# Patient Record
Sex: FEMALE | Race: WHITE | URBAN - METROPOLITAN AREA
[De-identification: names, ages, dates, MRNs, and addresses within clinical notes are randomized per-mention and may not be internally consistent; named-entity substitution may affect disease eponyms.]

---

## 2017-01-20 ENCOUNTER — OFFICE VISIT (OUTPATIENT)
Dept: EDUCATION SERVICES | Facility: CLINIC | Age: 27
End: 2017-01-20

## 2017-01-20 VITALS — HEIGHT: 63 IN | BODY MASS INDEX: 33.77 KG/M2 | WEIGHT: 190.6 LBS

## 2017-01-20 DIAGNOSIS — E16.1 HYPERINSULINEMIA: Primary | ICD-10-CM

## 2017-01-20 NOTE — MR AVS SNAPSHOT
After Visit Summary   1/20/2017    Janice Mendoza    MRN: [id number]           Patient Information     Date Of Birth          1990        Visit Information        Provider Department      1/20/2017 1:30 PM Jodi Pratt RD [facility] Diabetes        Care Instructions    1. We reviewed healthy diet and carbohydrate counting today  2. Spread your carbohydrate foods throughout the day to help prevent symptoms of low blood sugar  3. 45-60 grams of carbohydrate for all main meals and 15-30 grams carb for snacks inbetween meals  4. Stop drinking all sugar sweetened beverages  5. Follow up as needed  Jodi Pratt RD, LD, CDE  Diabetes Care  [address]  [address]  [place] [address]  Phone: [phone]  Appointment line: [phone]  Email: [email or web address]            Follow-ups after your visit        Your next 10 appointments already scheduled     Jan 25, 2017  9:30 AM   (Arrive by 9:15 AM)   RETURN KNEE with Jorge Estrada MD   [facility] Sports Medicine ([facility] [place])    [address]  [address] [facility] [address] [phone]            Jan 25, 2017 10:00 AM   (Arrive by 9:30 AM)   RETURN DIABETES with Emely Rivas MD   [facility] Endocrinology ([facility])    [address] [place] [address]  [address] [facility] [address] [phone]              Who to contact     Please call your clinic at [phone] to:    Ask questions about your health    Make or cancel appointments    Discuss your medicines    Learn about your test results    Speak to your doctor   If you have compliments or concerns about an experience at your clinic, or if you wish to file a complaint, please contact [facility] Patient Relations at [phone] or email us at [email or web address]         Additional Information About Your Visit        Luciano  Information     [employer] is an electronic gateway that provides easy, online access to your medical records. With [employer], you can request a clinic appointment, read your test results, renew a prescription or communicate with your care team.     To sign up for [employer] visit the website at [email or web address]   You will be asked to enter the access code listed below, as well as some personal information. Please follow the directions to create your username and password.     Your access code is: [email or web address]  Expires: 3/13/2017  6:31 AM     Your access code will  in 90 days. If you need help or a new code, please contact your [facility] or call [phone] for assistance.        Care EveryWhere ID     This is your Care EveryWhere ID. This could be used by other organizations to access your [place] medical records  [id number]         Blood Pressure from Last 3 Encounters:   16 108/74   16 109/62   16 131/82    Weight from Last 3 Encounters:   16 88.633 kg (195 lb 6.4 oz)   16 87.544 kg (193 lb)   16 89.3 kg (196 lb 13.9 oz)              Today, you had the following     No orders found for display       Primary Care Provider Office Phone # Fax Carol Harrison PA-C [phone]       [facility] [address]  [place] [facility] [address]        Thank you!     Thank you for choosing [facility] DIABETES  for your care. Our goal is always to provide you with excellent care. Hearing back from our patients is one way we can continue to improve our services. Please take a few minutes to complete the written survey that you may receive in the mail after your visit with us. Thank you!             Your Updated Medication List - Protect others around you: Learn how to safely use, store and throw away your medicines at www.disposemymeds.org.          This list is accurate as of: 17  2:45 PM.  Always use your most recent med list.                    Brand Name Dispense Instructions for use    blood glucose monitoring lancets     1 Box    Use to test blood sugar 4 times daily or as directed.       blood glucose monitoring meter device kit    no brand specified    1 kit    Use to test blood sugar 4 times daily or as directed.       BLOOD GLUCOSE TEST STRIPS Strp     100 each    4 times a day check       ZOLOFT PO      Take 150 mg by mouth daily
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
## 2017-02-01 ENCOUNTER — OFFICE VISIT (OUTPATIENT)
Dept: ORTHOPEDICS | Facility: CLINIC | Age: 27
End: 2017-02-01

## 2017-02-01 DIAGNOSIS — M25.561 CHRONIC PAIN OF RIGHT KNEE: Primary | ICD-10-CM

## 2017-02-01 DIAGNOSIS — G89.29 CHRONIC PAIN OF RIGHT KNEE: Primary | ICD-10-CM

## 2017-02-01 RX ORDER — TRIAMCINOLONE ACETONIDE 40 MG/ML
40 INJECTION, SUSPENSION INTRA-ARTICULAR; INTRAMUSCULAR ONCE
Qty: 1 ML | Refills: 0 | OUTPATIENT
Start: 2017-02-01 | End: 2017-02-01

## 2017-02-01 NOTE — NURSING NOTE
[address] [address]  Dept: [phone]  ______________________________________________________________________________    Patient: Janice Mendoza   : 1990   MRN: [id number]   2017    INVASIVE PROCEDURE SAFETY CHECKLIST    Date: 2017   Procedure: Right knee CSI with Kenalog  Patient Name: Janice Mendoza  MRN: [id number]  YOB: 1990    Action: Complete sections as appropriate. Any discrepancy results in a HARD COPY until resolved.     PRE PROCEDURE:  Patient ID verified with 2 identifiers (name and  or MRN): Yes  Procedure and site verified with patient/designee (when able): Yes  Accurate consent documentation in medical record: Yes  H&P (or appropriate assessment) documented in medical record: Yes  H&P must be up to 20 days prior to procedure and updates within 24 hours of procedure as applicable: NA  Relevant diagnostic and radiology test results appropriately labeled and displayed as applicable: Yes  Procedure site(s) marked with provider initials: NA    TIMEOUT:  Time-Out performed immediately prior to starting procedure, including verbal and active participation of all team members addressing the following:Yes  * Correct patient identify  * Confirmed that the correct side and site are marked  * An accurate procedure consent form  * Agreement on the procedure to be done  * Correct patient position  * Relevant images and results are properly labeled and appropriately displayed  * The need to administer antibiotics or fluids for irrigation purposes during the procedure as applicable   * Safety precautions based on patient history or medication use    DURING PROCEDURE: Verification of correct person, site, and procedures any time the responsibility for care of the patient is transferred to another member of the care team.
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
## 2017-10-16 ENCOUNTER — ANESTHESIA EVENT (OUTPATIENT)
Dept: SURGERY | Facility: AMBULATORY SURGERY CENTER | Age: 27
End: 2017-10-16

## 2017-10-16 ENCOUNTER — OFFICE VISIT - RIVER FALLS (OUTPATIENT)
Dept: FAMILY MEDICINE | Facility: CLINIC | Age: 27
End: 2017-10-16
Payer: COMMERCIAL

## 2017-10-16 ENCOUNTER — OFFICE VISIT (OUTPATIENT)
Dept: ORTHOPEDICS | Facility: CLINIC | Age: 27
End: 2017-10-16

## 2017-10-16 VITALS — WEIGHT: 195.33 LBS | HEIGHT: 63 IN | BODY MASS INDEX: 34.61 KG/M2

## 2017-10-16 DIAGNOSIS — M25.561 RIGHT KNEE PAIN, UNSPECIFIED CHRONICITY: Primary | ICD-10-CM

## 2017-10-16 DIAGNOSIS — G89.29 CHRONIC PAIN OF RIGHT KNEE: ICD-10-CM

## 2017-10-16 DIAGNOSIS — M25.561 CHRONIC PAIN OF RIGHT KNEE: ICD-10-CM

## 2017-10-16 ASSESSMENT — ENCOUNTER SYMPTOMS
POLYDIPSIA: 0
POSTURAL DYSPNEA: 0
CHILLS: 0
FATIGUE: 1
WEIGHT LOSS: 0
INCREASED ENERGY: 0
ARTHRALGIAS: 1
DYSPNEA ON EXERTION: 0
JOINT SWELLING: 1
NECK PAIN: 0
HEMOPTYSIS: 0
DECREASED APPETITE: 0
MUSCLE WEAKNESS: 1
COUGH: 0
POLYPHAGIA: 0
MUSCLE CRAMPS: 1
WEIGHT GAIN: 0
COUGH DISTURBING SLEEP: 0
BACK PAIN: 0
WHEEZING: 0
FEVER: 0
SPUTUM PRODUCTION: 0
MYALGIAS: 1
NIGHT SWEATS: 1
ALTERED TEMPERATURE REGULATION: 0
SNORES LOUDLY: 1
STIFFNESS: 1
SHORTNESS OF BREATH: 0
HALLUCINATIONS: 0

## 2017-10-16 ASSESSMENT — MIFFLIN-ST. JEOR: SCORE: 1570.92

## 2017-10-16 NOTE — NURSING NOTE
Reason For Visit:   Chief Complaint   Patient presents with     RECHECK     Right knee       ?  No  Occupation: EMT  Currently working? Yes.  Work status?  Full time.  Date of injury: None, new pain started over a week ago.   Date of surgery: 2/26/16 & 3/19/16  Type of surgery:Right Knee Examination Under Anesthesia, Arthroscopic  Anterior Cruciate Ligament Reconstruction With Hamstring Autograft. Posterolateral Corner Reconstruction with Allograft. Removal of Hardware;  Irrigation And Debridement Right Leg  choice anesthesia .  Smoker: Yes, pack a day  Request smoking cessation information: No    Pain Assessment  Patient Currently in Pain: Yes  0-10 Pain Scale: 7  Primary Pain Location: Knee  Pain Orientation: Right
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
## 2017-10-16 NOTE — LETTER
10/16/2017       RE: Janice Mendoza  [address] [facility] [address]     Dear Colleague,    Thank you for referring your patient, Janice Mendoza, to the [facility] at [facility]. Please see a copy of my visit note below.    HISTORY OF PRESENT ILLNESS:  Armando is a pleasant 26-year-old female who had a devastating lateral plateau fracture in 06/2016.  She underwent open reduction internal fixation at [facility] and then subsequently underwent ACL PLC reconstruction by Dr. Estrada.  She was doing well for several months.  She had an injection in her knee joint in 02/2016 which provided good relief.  Unfortunately, over the last few weeks without trauma, she has had worsening lateral knee pain and the sense of instability.  She obtained an x-ray by her PCP who told her that she had evidence of hardware failure and instructed her to follow up with Dr. Estrada.  She has been ambulating with crutches ever since.  She denies fevers, chills, nausea, vomiting.  She denies numbness or tingling.      PHYSICAL EXAMINATION:  Pleasant, age-appropriate female in no acute distress.   RESPIRATORY:  Nonlabored.   CARDIOVASCULAR:  Regular rate and rhythm.  Extremities warm and well perfused.   FOCUSED EXAMINATION:  Of the right lower extremity reveals a well-healed incision.  She fires EHL, FHL, gastrocsoleus complex and tibialis anterior with 5/5 strength.  Sensation intact to light touch in sural, saphenous, deep peroneal, superficial peroneal and tibial nerve distributions.  No effusion.  1+ laxity to varus at 0 and 30 degrees flexion.  1A Lachman's compared with the contralateral side.  Tender to palpation of her pes bursa and lateral joint line.      IMAGING:  X-rays of the right knee were obtained which show interval progression of her lateral compartment osteoarthritis.  There is no evidence of hardware failure or loosening.  By the patient's report, there was  concern of loosening around the BioComposite screw in the fibular head, which is not indicated by these x-rays.      IMPRESSION:  Status post multi-ligament reconstruction with lateral compartment osteoarthritis and pain.        PLAN:  Discussed the findings with the patient.  There is no radiographic evidence of failure and her ligamentous exam while not normal, shows that her ligamentous reconstruction is intact.  Discussed that this may be a flare and related to her progression of degenerative changes.  It may be reasonable to proceed with another injection versus anti-inflammatories and if this is a flare that she will continue to do well.  If she is ultimately interested in pursuing surgical options, a first stage would be hardware removal and arthroscopy to evaluate the state of the lateral plateau and to allow for advanced imaging.  We would not expect that her pain would be significantly improved with hardware removal, but this would be important for the next step of cartilage reconstruction procedure.  She was advised that cartilage reconstruction procedure would not be done while she is still smoking.  She was interested in undergoing hardware removal.  This will be a same-day procedure.  We will remove the tibial plate as well as perhaps the knot over the tibial tunnel of the ACL reconstruction.  The BioComposite screws will be left in place.  The risks include heart attack, stroke and death.  There is a risk of continued pain, progression of osteoarthritis.  We will perform a diagnostic scope at the same time to evaluate possible next steps.  All questions and concerns were answered.      The patient was seen and examined with Dr. Estrada who agrees with the assessment and plan.       Answers for HPI/ROS submitted by the patient on 10/16/2017   General Symptoms: Yes  Skin Symptoms: No  HENT Symptoms: No  EYE SYMPTOMS: No  HEART SYMPTOMS: No  LUNG SYMPTOMS: Yes  INTESTINAL SYMPTOMS: No  URINARY SYMPTOMS:  No  GYNECOLOGIC SYMPTOMS: No  BREAST SYMPTOMS: No  SKELETAL SYMPTOMS: Yes  BLOOD SYMPTOMS: No  NERVOUS SYSTEM SYMPTOMS: No  MENTAL HEALTH SYMPTOMS: No  Fever: No  Loss of appetite: No  Weight loss: No  Weight gain: No  Fatigue: Yes  Night sweats: Yes  Chills: No  Increased stress: No  Excessive hunger: No  Excessive thirst: No  Feeling hot or cold when others believe the temperature is normal: No  Loss of height: No  Post-operative complications: Yes  Surgical site pain: Yes  Hallucinations: No  Change in or Loss of Energy: No  Hyperactivity: No  Confusion: No  Cough: No  Sputum or phlegm: No  Coughing up blood: No  Difficulty breating or shortness of breath: No  Snoring: Yes  Wheezing: No  Difficulty breathing on exertion: No  Nighttime Cough: No  Difficulty breathing when lying flat: No  Back pain: No  Muscle aches: Yes  Neck pain: No  Swollen joints: Yes  Joint pain: Yes  Bone pain: Yes  Muscle cramps: Yes  Muscle weakness: Yes  Joint stiffness: Yes  Bone fracture: Yes      Patient seen and examined with the resident.     Assesment: Right knee pain following tibial plateau fracture    Lateral compartment chondrosis    S/p ACL and PLC reconstruction with intact ligaments though some increased translastion    Smoking    Retained hardware    Plan: Reviewed options discussed continued nonop management and maximization of nonop cares, including, time, NSAIDS, injections, PT.    Patient is interested in proceeding with reconstructive surgery. I have recommended a staged approach.    EUA, Arthroscopy, staging for future cartilage and removal of hardware of knee (Synthes Implants in place). Will leave fibular biocomposite screw and acl hardware (may remove acl knot over tibia)    Will use the information gained to define a plan for future cartilage reconstruction. If improves without, no future surgery will be be needed. Will need for smoking cessation prior to cartilage reconstruction.    I discussed with the patient  the risks, benefits, complications and techniques of surgery as well as the natural history of knee pain, cartilage wear and meniscus wear and the alternative treatment options.    The risks include, but are not limited to the risk of death and risk of a myocardial infarction, risk of bleeding and a risk of infection, risk of nerve damage and a risk of muscle damage, stiffness, instability, continued pain or worsening pain and need for future surgery, continued pain, failure to improve.    The patient was provided an opportunity to ask questions and these were answered.      I agree with history, physical and imaging as well as the assessment and plan as detailed by Dr. dickerson.       Again, thank you for allowing me to participate in the care of your patient.      Sincerely,    Jorge Estrada MD

## 2017-10-16 NOTE — NURSING NOTE
Teaching Flowsheet   Relevant Diagnosis: Right knee retained hardware, tibial plateau fracture.  Teaching Topic: Right knee EUA, arthroscopy, evaluation of cartilage surfaces and staging for future reconstruction, open hardware removal, right leg.     Person(s) involved in teaching:   Patient     Motivation Level:  Asks Questions: Yes  Eager to Learn: Yes  Cooperative: Yes  Receptive (willing/able to accept information): Yes  Any cultural factors/Anabaptist beliefs that may influence understanding or compliance? No     Patient demonstrates understanding of the following:  Reason for the appointment, diagnosis and treatment plan: Yes  Knowledge of proper use of medications and conditions for which they are ordered (with special attention to potential side effects or drug interactions): Yes  Which situations necessitate calling provider and whom to contact: Yes     Teaching Concerns Addressed:      Proper use and care of assistive devices; patient has crutches (medical equip, care aids, etc.): Yes  Nutritional needs and diet plan: NA  Pain management techniques: Yes  Wound Care: Yes  How and/when to access community resources: Yes     Instructional Materials Used/Given: Preoperative/postoperative teaching packet, surgical soap.     Health history positive for asthma, not using daily inhalers and smoking.    a. Does the patient take five or more prescription medications? No  b. Does patient have difficulty walking up two flights of stairs? No  c. Is patient s BMI 35 or greater? No  d. Is patient an insulin dependent diabetic? No  e. Does patient have a history of having a heart attack or a heart surgery of any kind? No  f. Does patient have a history of heart failure? No  g. Does the patient have a history of any type of transplant? No  h. Does the patient use inhalers on a daily basis? No  i. Does the patient have a history of pulmonary hypertension? No  Once the patient is evaluated by PAC contact (ex: Surgery  schedulers name and number, RN's name and number, etc..);  Elaine [phone]  Name of planned surgery______________________________

## 2017-10-16 NOTE — MR AVS SNAPSHOT
After Visit Summary   10/16/2017    Janice Mendoza    MRN: [id number]           Patient Information     Date Of Birth          1990        Visit Information        Provider Department      10/16/2017 9:30 AM Jorge Estrada MD [facility]        Today's Diagnoses     Right knee pain, unspecified chronicity    -  1    Chronic pain of right knee           Follow-ups after your visit        Your next 10 appointments already scheduled     Oct 17, 2017   Procedure with Jorge Estrada MD   [facility] ([facility] [place])    [address] [place] [address]  5th Floor  [facility] [id number] [phone]           Located in the Clinics and Surgery Center at [address] [place] [address].   parking is very convenient and highly recommended.  is a $6 flat rate fee.  Both  and self parkers should enter the main arrival plaza from [facility]; parking attendants will direct you based on your parking preference.            Oct 23, 2017  9:10 AM CDT   (Arrive by 8:55 AM)   RETURN KNEE with Jorge Estrada MD   [facility] ([facility])    [address] [place] [address]  4th Floor  [facility] [address] [phone]              Future tests that were ordered for you today     Open Standing Orders        Priority Remaining Interval Expires Ordered    Oxygen: Nasal cannula Routine 89474/20286 CONTINUOUS  10/17/2017            Who to contact     Please call your clinic at [phone] to:    Ask questions about your health    Make or cancel appointments    Discuss your medicines    Learn about your test results    Speak to your doctor   If you have compliments or concerns about an experience at your clinic, or if you wish to file a complaint, please contact [facility] Patient Relations at [phone] or email us at  [email or web address]         Additional Information About Your Visit        [employer] Information     [employer] is an electronic gateway that provides easy, online access to your medical records. With [employer], you can request a clinic appointment, read your test results, renew a prescription or communicate with your care team.     To sign up for [employer] visit the website at [email or web address]   You will be asked to enter the access code listed below, as well as some personal information. Please follow the directions to create your username and password.     Your access code is: [email or web address]  Expires: 2018  6:31 AM     Your access code will  in 90 days. If you need help or a new code, please contact your [facility] or call [phone] for assistance.        Care EveryWhere ID     This is your Care EveryWhere ID. This could be used by other organizations to access your [facility] medical records  [id number]        Your Vitals Were     Height Last Period BMI (Body Mass Index)             1.6 m (5' 3\") 2016 (Exact Date) 34.6 kg/m2          Blood Pressure from Last 3 Encounters:   10/17/17 112/74   16 108/74   16 109/62    Weight from Last 3 Encounters:   10/17/17 88.5 kg (195 lb 3.2 oz)   10/16/17 88.6 kg (195 lb 5.2 oz)   17 86.5 kg (190 lb 9.6 oz)              We Performed the Following     Pricilla-Operative Worksheet        Primary Care Provider Office Phone # Fax #    Gustavo Harrison PA-C [phone]       [facility] [address] [facility] [address]  [place] [facility] [address]        Equal Access to Services     DORA BONILLA : Hadii socorro hightower Soyvrose, waaxda luqadaha, qaybta kaalmada adeegyada, betito del valle. So [facility] [place] [phone].    ATENCIÓN: Si habla español, tiene a hannah disposición servicios gratuitos de asistencia lingüística. Llame al [phone].    We comply with applicable federal civil rights " laws and [place] laws. We do not discriminate on the basis of race, color, national origin, age, disability, sex, sexual orientation, or gender identity.            Thank you!     Thank you for choosing [facility]  for your care. Our goal is always to provide you with excellent care. Hearing back from our patients is one way we can continue to improve our services. Please take a few minutes to complete the written survey that you may receive in the mail after your visit with us. Thank you!             Your Updated Medication List - Protect others around you: Learn how to safely use, store and throw away your medicines at www.disposemymeds.org.          This list is accurate as of: 10/16/17 11:59 PM.  Always use your most recent med list.                   Brand Name Dispense Instructions for use Diagnosis    acetaminophen 325 MG tablet    TYLENOL    100 tablet    Take 2 tablets (650 mg) by mouth every 4 hours as needed for other (mild pain)    Closed fracture of right tibial plateau with routine healing, subsequent encounter       albuterol 108 (90 BASE) MCG/ACT Inhaler    PROAIR HFA/PROVENTIL HFA/VENTOLIN HFA     Inhale 2 puffs into the lungs every 6 hours        ALPRAZOLAM PO      Take 0.25 mg by mouth as needed for anxiety        blood glucose monitoring lancets     1 Box    Use to test blood sugar 4 times daily or as directed.    Hyperglycemia       blood glucose monitoring meter device kit    no brand specified    1 kit    Use to test blood sugar 4 times daily or as directed.    Hyperglycemia       BLOOD GLUCOSE TEST STRIPS Strp     100 each    4 times a day check    Hyperglycemia       CALCIUM 500 PO           CITALOPRAM HYDROBROMIDE PO      Take 10 mg by mouth daily        hydrOXYzine 25 MG tablet    ATARAX    30 tablet    Take 1 tablet (25 mg) by mouth every 6 hours as needed for itching (and nausea)    Closed fracture of right tibial plateau with routine healing, subsequent encounter        ondansetron 4 MG ODT tab    ZOFRAN-ODT    4 tablet    Take 1-2 tablets (4-8 mg) by mouth every 8 hours as needed for nausea Dissolve ON the tongue.    Closed fracture of right tibial plateau with routine healing, subsequent encounter       oxyCODONE 5 MG IR tablet    ROXICODONE    40 tablet    Take 1 tablet (5 mg) by mouth every 4 hours as needed for pain or other (Moderate to Severe)    Closed fracture of right tibial plateau with routine healing, subsequent encounter       senna-docusate 8.6-50 MG per tablet    SENOKOT-S;PERICOLACE    30 tablet    Take 1-2 tablets by mouth 2 times daily Take while on oral narcotics to prevent or treat constipation.    Closed fracture of right tibial plateau with routine healing, subsequent encounter       VITAMIN B 12 PO      Take by mouth daily

## 2017-10-16 NOTE — PROGRESS NOTES
Patient seen and examined with the resident.     Assesment: Right knee pain following tibial plateau fracture    Lateral compartment chondrosis    S/p ACL and PLC reconstruction with intact ligaments though some increased translastion    Smoking    Retained hardware    Plan: Reviewed options discussed continued nonop management and maximization of nonop cares, including, time, NSAIDS, injections, PT.    Patient is interested in proceeding with reconstructive surgery. I have recommended a staged approach.    EUA, Arthroscopy, staging for future cartilage and removal of hardware of knee (Synthes Implants in place). Will leave fibular biocomposite screw and acl hardware (may remove acl knot over tibia)    Will use the information gained to define a plan for future cartilage reconstruction. If improves without, no future surgery will be be needed. Will need for smoking cessation prior to cartilage reconstruction.    I discussed with the patient the risks, benefits, complications and techniques of surgery as well as the natural history of knee pain, cartilage wear and meniscus wear and the alternative treatment options.    The risks include, but are not limited to the risk of death and risk of a myocardial infarction, risk of bleeding and a risk of infection, risk of nerve damage and a risk of muscle damage, stiffness, instability, continued pain or worsening pain and need for future surgery, continued pain, failure to improve.    The patient was provided an opportunity to ask questions and these were answered.      I agree with history, physical and imaging as well as the assessment and plan as detailed by Dr. dickerson.

## 2017-10-17 ENCOUNTER — HOSPITAL ENCOUNTER (OUTPATIENT)
Facility: AMBULATORY SURGERY CENTER | Age: 27
End: 2017-10-17
Attending: ORTHOPAEDIC SURGERY

## 2017-10-17 ENCOUNTER — SURGERY (OUTPATIENT)
Age: 27
End: 2017-10-17

## 2017-10-17 ENCOUNTER — ANESTHESIA (OUTPATIENT)
Dept: SURGERY | Facility: AMBULATORY SURGERY CENTER | Age: 27
End: 2017-10-17

## 2017-10-17 VITALS
HEIGHT: 63 IN | SYSTOLIC BLOOD PRESSURE: 121 MMHG | DIASTOLIC BLOOD PRESSURE: 77 MMHG | WEIGHT: 195.2 LBS | TEMPERATURE: 97.8 F | OXYGEN SATURATION: 92 % | RESPIRATION RATE: 14 BRPM | BODY MASS INDEX: 34.59 KG/M2

## 2017-10-17 DIAGNOSIS — S82.141D CLOSED FRACTURE OF RIGHT TIBIAL PLATEAU WITH ROUTINE HEALING, SUBSEQUENT ENCOUNTER: Primary | ICD-10-CM

## 2017-10-17 RX ORDER — ONDANSETRON 4 MG/1
4 TABLET, ORALLY DISINTEGRATING ORAL EVERY 30 MIN PRN
Status: DISCONTINUED | OUTPATIENT
Start: 2017-10-17 | End: 2017-10-18 | Stop reason: HOSPADM

## 2017-10-17 RX ORDER — ONDANSETRON 4 MG/1
4-8 TABLET, ORALLY DISINTEGRATING ORAL EVERY 8 HOURS PRN
Qty: 4 TABLET | Refills: 0 | Status: SHIPPED | OUTPATIENT
Start: 2017-10-17 | End: 2018-02-09

## 2017-10-17 RX ORDER — MAGNESIUM HYDROXIDE 1200 MG/15ML
LIQUID ORAL PRN
Status: DISCONTINUED | OUTPATIENT
Start: 2017-10-17 | End: 2017-10-17 | Stop reason: HOSPADM

## 2017-10-17 RX ORDER — OXYCODONE HYDROCHLORIDE 5 MG/1
5 TABLET ORAL EVERY 4 HOURS PRN
Qty: 40 TABLET | Refills: 0 | Status: SHIPPED | OUTPATIENT
Start: 2017-10-17 | End: 2018-02-09

## 2017-10-17 RX ORDER — ALBUTEROL SULFATE 90 UG/1
2 AEROSOL, METERED RESPIRATORY (INHALATION) EVERY 6 HOURS
COMMUNITY

## 2017-10-17 RX ORDER — BUPIVACAINE HYDROCHLORIDE 2.5 MG/ML
INJECTION, SOLUTION EPIDURAL; INFILTRATION; INTRACAUDAL PRN
Status: DISCONTINUED | OUTPATIENT
Start: 2017-10-17 | End: 2017-10-17

## 2017-10-17 RX ORDER — ONDANSETRON 4 MG/1
4 TABLET, ORALLY DISINTEGRATING ORAL
Status: DISCONTINUED | OUTPATIENT
Start: 2017-10-17 | End: 2017-10-18 | Stop reason: HOSPADM

## 2017-10-17 RX ORDER — HYDROXYZINE HYDROCHLORIDE 25 MG/1
25 TABLET, FILM COATED ORAL
Status: DISCONTINUED | OUTPATIENT
Start: 2017-10-17 | End: 2017-10-18 | Stop reason: HOSPADM

## 2017-10-17 RX ORDER — GABAPENTIN 300 MG/1
300 CAPSULE ORAL ONCE
Status: COMPLETED | OUTPATIENT
Start: 2017-10-17 | End: 2017-10-17

## 2017-10-17 RX ORDER — ACETAMINOPHEN 325 MG/1
975 TABLET ORAL ONCE
Status: COMPLETED | OUTPATIENT
Start: 2017-10-17 | End: 2017-10-17

## 2017-10-17 RX ORDER — KETOROLAC TROMETHAMINE 30 MG/ML
30 INJECTION, SOLUTION INTRAMUSCULAR; INTRAVENOUS ONCE
Status: COMPLETED | OUTPATIENT
Start: 2017-10-17 | End: 2017-10-17

## 2017-10-17 RX ORDER — AMOXICILLIN 250 MG
1-2 CAPSULE ORAL 2 TIMES DAILY
Qty: 30 TABLET | Refills: 0 | Status: SHIPPED | OUTPATIENT
Start: 2017-10-17 | End: 2017-10-23

## 2017-10-17 RX ORDER — MEPERIDINE HYDROCHLORIDE 25 MG/ML
12.5 INJECTION INTRAMUSCULAR; INTRAVENOUS; SUBCUTANEOUS
Status: DISCONTINUED | OUTPATIENT
Start: 2017-10-17 | End: 2017-10-18 | Stop reason: HOSPADM

## 2017-10-17 RX ORDER — ACETAMINOPHEN 325 MG/1
650 TABLET ORAL EVERY 4 HOURS PRN
Qty: 100 TABLET | Refills: 0 | Status: SHIPPED | OUTPATIENT
Start: 2017-10-17

## 2017-10-17 RX ORDER — SODIUM CHLORIDE, SODIUM LACTATE, POTASSIUM CHLORIDE, CALCIUM CHLORIDE 600; 310; 30; 20 MG/100ML; MG/100ML; MG/100ML; MG/100ML
INJECTION, SOLUTION INTRAVENOUS CONTINUOUS
Status: DISCONTINUED | OUTPATIENT
Start: 2017-10-17 | End: 2017-10-18 | Stop reason: HOSPADM

## 2017-10-17 RX ORDER — NALOXONE HYDROCHLORIDE 0.4 MG/ML
.1-.4 INJECTION, SOLUTION INTRAMUSCULAR; INTRAVENOUS; SUBCUTANEOUS
Status: DISCONTINUED | OUTPATIENT
Start: 2017-10-17 | End: 2017-10-18 | Stop reason: HOSPADM

## 2017-10-17 RX ORDER — LIDOCAINE HYDROCHLORIDE 20 MG/ML
INJECTION, SOLUTION INFILTRATION; PERINEURAL PRN
Status: DISCONTINUED | OUTPATIENT
Start: 2017-10-17 | End: 2017-10-17

## 2017-10-17 RX ORDER — FENTANYL CITRATE 50 UG/ML
INJECTION, SOLUTION INTRAMUSCULAR; INTRAVENOUS PRN
Status: DISCONTINUED | OUTPATIENT
Start: 2017-10-17 | End: 2017-10-17

## 2017-10-17 RX ORDER — DEXAMETHASONE SODIUM PHOSPHATE 4 MG/ML
INJECTION, SOLUTION INTRA-ARTICULAR; INTRALESIONAL; INTRAMUSCULAR; INTRAVENOUS; SOFT TISSUE PRN
Status: DISCONTINUED | OUTPATIENT
Start: 2017-10-17 | End: 2017-10-17

## 2017-10-17 RX ORDER — OXYCODONE HYDROCHLORIDE 5 MG/1
5 TABLET ORAL EVERY 4 HOURS PRN
Status: DISCONTINUED | OUTPATIENT
Start: 2017-10-17 | End: 2017-10-18 | Stop reason: HOSPADM

## 2017-10-17 RX ORDER — SODIUM CHLORIDE, SODIUM LACTATE, POTASSIUM CHLORIDE, CALCIUM CHLORIDE 600; 310; 30; 20 MG/100ML; MG/100ML; MG/100ML; MG/100ML
INJECTION, SOLUTION INTRAVENOUS CONTINUOUS
Status: DISCONTINUED | OUTPATIENT
Start: 2017-10-17 | End: 2017-10-17 | Stop reason: HOSPADM

## 2017-10-17 RX ORDER — FENTANYL CITRATE 50 UG/ML
25-50 INJECTION, SOLUTION INTRAMUSCULAR; INTRAVENOUS
Status: DISCONTINUED | OUTPATIENT
Start: 2017-10-17 | End: 2017-10-17 | Stop reason: HOSPADM

## 2017-10-17 RX ORDER — ONDANSETRON 2 MG/ML
4 INJECTION INTRAMUSCULAR; INTRAVENOUS EVERY 30 MIN PRN
Status: DISCONTINUED | OUTPATIENT
Start: 2017-10-17 | End: 2017-10-18 | Stop reason: HOSPADM

## 2017-10-17 RX ORDER — CEFAZOLIN SODIUM 1 G/3ML
1 INJECTION, POWDER, FOR SOLUTION INTRAMUSCULAR; INTRAVENOUS SEE ADMIN INSTRUCTIONS
Status: DISCONTINUED | OUTPATIENT
Start: 2017-10-17 | End: 2017-10-17 | Stop reason: HOSPADM

## 2017-10-17 RX ORDER — HYDROXYZINE HYDROCHLORIDE 25 MG/1
25 TABLET, FILM COATED ORAL EVERY 6 HOURS PRN
Qty: 30 TABLET | Refills: 0 | Status: SHIPPED | OUTPATIENT
Start: 2017-10-17

## 2017-10-17 RX ORDER — PROPOFOL 10 MG/ML
INJECTION, EMULSION INTRAVENOUS CONTINUOUS PRN
Status: DISCONTINUED | OUTPATIENT
Start: 2017-10-17 | End: 2017-10-17

## 2017-10-17 RX ORDER — FENTANYL CITRATE 50 UG/ML
25-50 INJECTION, SOLUTION INTRAMUSCULAR; INTRAVENOUS
Status: DISCONTINUED | OUTPATIENT
Start: 2017-10-17 | End: 2017-10-18 | Stop reason: HOSPADM

## 2017-10-17 RX ORDER — BUPIVACAINE HYDROCHLORIDE AND EPINEPHRINE 2.5; 5 MG/ML; UG/ML
INJECTION, SOLUTION INFILTRATION; PERINEURAL PRN
Status: DISCONTINUED | OUTPATIENT
Start: 2017-10-17 | End: 2017-10-17 | Stop reason: HOSPADM

## 2017-10-17 RX ORDER — ONDANSETRON 2 MG/ML
INJECTION INTRAMUSCULAR; INTRAVENOUS PRN
Status: DISCONTINUED | OUTPATIENT
Start: 2017-10-17 | End: 2017-10-17

## 2017-10-17 RX ORDER — PROPOFOL 10 MG/ML
INJECTION, EMULSION INTRAVENOUS PRN
Status: DISCONTINUED | OUTPATIENT
Start: 2017-10-17 | End: 2017-10-17

## 2017-10-17 RX ADMIN — Medication 0.5 MG: at 10:33

## 2017-10-17 RX ADMIN — SODIUM CHLORIDE, SODIUM LACTATE, POTASSIUM CHLORIDE, CALCIUM CHLORIDE: 600; 310; 30; 20 INJECTION, SOLUTION INTRAVENOUS at 08:17

## 2017-10-17 RX ADMIN — BUPIVACAINE HYDROCHLORIDE 10 ML: 2.5 INJECTION, SOLUTION EPIDURAL; INFILTRATION; INTRACAUDAL at 13:24

## 2017-10-17 RX ADMIN — Medication 0.5 MG: at 10:50

## 2017-10-17 RX ADMIN — DEXAMETHASONE SODIUM PHOSPHATE 4 MG: 4 INJECTION, SOLUTION INTRA-ARTICULAR; INTRALESIONAL; INTRAMUSCULAR; INTRAVENOUS; SOFT TISSUE at 10:00

## 2017-10-17 RX ADMIN — PROPOFOL: 10 INJECTION, EMULSION INTRAVENOUS at 11:37

## 2017-10-17 RX ADMIN — PROPOFOL: 10 INJECTION, EMULSION INTRAVENOUS at 10:16

## 2017-10-17 RX ADMIN — MAGNESIUM HYDROXIDE 3000 ML: 1200 LIQUID ORAL at 10:28

## 2017-10-17 RX ADMIN — MAGNESIUM HYDROXIDE 1000 ML: 1200 LIQUID ORAL at 12:07

## 2017-10-17 RX ADMIN — FENTANYL CITRATE 50 MCG: 50 INJECTION, SOLUTION INTRAMUSCULAR; INTRAVENOUS at 10:11

## 2017-10-17 RX ADMIN — PROPOFOL 50 MG: 10 INJECTION, EMULSION INTRAVENOUS at 09:51

## 2017-10-17 RX ADMIN — Medication 0.5 MG: at 11:31

## 2017-10-17 RX ADMIN — KETOROLAC TROMETHAMINE 30 MG: 30 INJECTION, SOLUTION INTRAMUSCULAR; INTRAVENOUS at 12:54

## 2017-10-17 RX ADMIN — GABAPENTIN 300 MG: 300 CAPSULE ORAL at 08:17

## 2017-10-17 RX ADMIN — FENTANYL CITRATE 50 MCG: 50 INJECTION, SOLUTION INTRAMUSCULAR; INTRAVENOUS at 09:49

## 2017-10-17 RX ADMIN — BUPIVACAINE HYDROCHLORIDE AND EPINEPHRINE 27 ML: 2.5; 5 INJECTION, SOLUTION INFILTRATION; PERINEURAL at 10:12

## 2017-10-17 RX ADMIN — OXYCODONE HYDROCHLORIDE 5 MG: 5 TABLET ORAL at 12:42

## 2017-10-17 RX ADMIN — LIDOCAINE HYDROCHLORIDE 60 MG: 20 INJECTION, SOLUTION INFILTRATION; PERINEURAL at 09:49

## 2017-10-17 RX ADMIN — Medication 0.5 MG: at 12:23

## 2017-10-17 RX ADMIN — PROPOFOL 150 MCG/KG/MIN: 10 INJECTION, EMULSION INTRAVENOUS at 09:51

## 2017-10-17 RX ADMIN — PROPOFOL: 10 INJECTION, EMULSION INTRAVENOUS at 10:56

## 2017-10-17 RX ADMIN — ACETAMINOPHEN 975 MG: 325 TABLET ORAL at 08:17

## 2017-10-17 RX ADMIN — PROPOFOL 250 MG: 10 INJECTION, EMULSION INTRAVENOUS at 09:49

## 2017-10-17 RX ADMIN — ONDANSETRON 4 MG: 2 INJECTION INTRAMUSCULAR; INTRAVENOUS at 10:00

## 2017-10-17 ASSESSMENT — LIFESTYLE VARIABLES: TOBACCO_USE: 1

## 2017-10-17 NOTE — IP AVS SNAPSHOT
[facility]    [address] [place] [address]    Phone:  [phone]    Fax:  [phone]                                       After Visit Summary   10/17/2017    Janice Mendoza    MRN: [id number]           After Visit Summary Signature Page     I have received my discharge instructions, and my questions have been answered. I have discussed any challenges I see with this plan with the nurse or doctor.    ..........................................................................................................................................  Patient/Patient Representative Signature      ..........................................................................................................................................  Patient Representative Print Name and Relationship to Patient    ..................................................               ................................................  Date                                            Time    ..........................................................................................................................................  Reviewed by Signature/Title    ...................................................              ..............................................  Date                                                            Time

## 2017-10-17 NOTE — DISCHARGE INSTRUCTIONS
[facility]  Home Care Following Anesthesia  For 24 hours after surgery:  1. Get plenty of rest.  A responsible adult must stay with you for at least 24 hours after you leave the surgery center.  2. Do not drive or use heavy equipment.  If you have weakness or tingling, don't drive or use heavy equipment until this feeling goes away.   3. Do not drink alcohol.   4. Avoid strenuous or risky activities.  Ask for help when climbing stairs.  5. You may feel lightheaded.  IF so, sit for a few minutes before standing.  Have someone help you get up.   6. If you have nausea (feel sick to your stomach): Drink only clear liquids such as apple juice, ginger ale, broth or 7-Up.  Rest may also help.  Be sure to drink enough fluids.  Move to a regular diet as you feel able.   7. You may have a slight fever.  Call the doctor if your fever is over 100 F (37.7 C) (taken under the tongue) or lasts longer than 24 hours.  8. You may have a dry mouth, a sore throat, muscle aches or trouble sleeping. These should go away after 24 hours.  9. Do not make important or legal decisions.   If you use hormonal birth control (such as the pill, patch, ring or implants):  You will need a second form of birth control for 7 days (condoms, a diaphragm or contraceptive foam).  While in the surgery center, you received a medicine called Sugammadex.  Hormonal birth control (such as the pill, patch, ring or implants) will not work as well for a week after taking this medicine.         Tips for taking pain medications  To get the best pain relief possible, remember these points:    Take pain medications as directed, before pain becomes severe.    Pain medication can upset your stomach: taking it with food may help.    Constipation is a common side effect of pain medication. Drink plenty of  fluids.    Eat foods high in fiber. Take a stool softener if recommended by your doctor or pharmacist.    Do not drink alcohol,  drive or operate machinery while taking pain medications.    Ask about other ways to control pain, such as with heat, ice or relaxation.    Tylenol/Acetaminophen Consumption  To help encourage the safe use of acetaminophen, the makers of TYLENOL  have lowered the maximum daily dose for single-ingredient Extra Strength TYLENOL  (acetaminophen) products sold in the [place] from 8 pills per day (4,000 mg) to 6 pills per day (3,000 mg). The dosing interval has also changed from 2 pills every 4-6 hours to 2 pills every 6 hours.    If you feel your pain relief is insufficient, you may take Tylenol/Acetaminophen in addition to your narcotic pain medication.     Be careful not to exceed 3,000 mg of Tylenol/Acetaminophen in a 24 hour period from all sources.    If you are taking extra strength Tylenol/acetaminophen (500 mg), the maximum dose is 6 tablets in 24 hours.    If you are taking regular strength acetaminophen (325 mg), the maximum dose is 9 tablets in 24 hours.    Call a doctor for any of the followin. Signs of infection (fever, growing tenderness at the surgery site, a large amount of drainage or bleeding, severe pain, foul-smelling drainage, redness, swelling).  2. It has been over 8 to 10 hours since surgery and you are still not able to urinate (pass water).  3. Headache for over 24 hours.  Your doctor is:       Dr. Jorge Estrada, Orthopaedics: [phone]               Or dial [phone] and ask for the resident on call for:  Orthopaedics  For emergency care, call the:  [facility] Emergency Department: [phone] (TTY for hearing impaired: [phone])  Post Operative Instructions: Regional Anesthetic for Lower Extremity     General Information:   Regional anesthesia is when local anesthetic or  numbing  medication is injected around the nerves to anesthetize or  numb  the area supplied by that set of nerves. It is a type of analgesia used to control pain and decreases the need for narcotics following  surgery.    Types of Regional Blocks:  Femoral: A block injected into the groin area of the operative leg of a patient having thigh or knee surgery.  Adductor Canal: A block injected into the mid thigh of the operative leg of a patient having knee or ankle surgery.  Popliteal or Distal Sciatic: A block injected into the back of the knee of the operative leg of patients having foot or ankle surgery.   Ankle:  An anesthetic medication is injected into the ankle of the operative leg of a patient having foot or toe surgery.     Procedure:   The type of anesthesia your doctor used to numb your leg will usually not wear off for 6-18 hours, but may last as long as 24 hours. You should be careful during that period, since it is possible to injure your leg without being aware of the injury. While your leg is numb you should:    Use crutches (minimal weight bearing until your motor and strength is completely back to normal)    Avoid striking or bumping your leg    Avoid extreme hot or cold    Discomfort:  You will have a tingling and prickly sensation in your leg as the feeling begins to return; you can also expect some discomfort. The amount of discomfort is unpredictable, but if you have more pain than can be controlled with pain medication, you should notify your physician.     Pain Medicine:   Begin taking your oral pain pills (if you have not already done so) before bedtime and during the night to avoid a sudden onset of pain as the block wears off.  Do not engage in drinking, driving, or hazardous occupations while taking pain medication.   .ucSafety Tips for Using Crutches    Crutch Fit:    Assume good standing posture with shoulders relaxed and crutch tips 6-8 inches out from the side of the foot.    The underarm pad should fall 2-3 fingers width below the armpit.    The handgrip is positioned level with the wrist to allow 30  flexion at the elbow.    Safety Tips:    Bear weight on your hands, not on your  "armpits.    Do not add extra padding to the underarm pad. This will, in effect, lengthen the crutches and increase risk of nerve injury.    Wear flat, properly fitting shoes. Do not walk in stocking feet, high heels or slippers.    Household hazards:  --Throw rugs should be removed from floors.  --Stairs should be cleared of obstacles.  --Use extra caution on slippery, highly polished, littered or uneven floor surfaces.  --Check for electric cords.    Check crutch tips for excessive wear and keep wing nuts tight.    While walking, look forward with  head up  and  eyes open.  Take equal length steps.    Use BOTH crutches.    Stairs Sequence:    UP: \"Good\" leg first, followed by  bad  leg, then crutches.    DOWN: Crutches, followed by  bad  leg, \"good\" leg.     Walking with Crutches:    Move both crutches forward at the same time.    Non-Weight Bearing (NWB):  Hold the involved leg up and swing through the crutches with the involved leg. The involved leg does not touch the floor.    Toe Touch Weight Bearing (TTWB): Move the involved leg forward. Rest it lightly on the floor for balance only. Step through the crutches with the uninvolved leg.    Partial Weight Bearing (PWB): Move the involved leg forward. Step down the weight of the leg only.  Step through the crutches with the uninvolved leg.    Weight Bearing As Tolerated (WBAT): Move the involved leg forward. Put as much pressure through the involved leg as you can tolerate comfortably. Then step through the crutches with the uninvolved leg.    [facility]  [facility]  [address] [place] [address]  [phone]  [phone]    10/17/2017    Janice Mendoza  [address] [facility] [address]  [phone] (home)     :  1990    To Whom it May Concern:    Janice Mendoza missed school on 10/17/2017 due to surgery. May return to school Friday " 10/20/17    Please contact me for any questions or concerns.    Sincerely,          [facility]  [facility]  [address]  [address] [facility] [address]  [phone]  [phone]    10/17/2017    Janice Mendoza  [facility] [address] [facility] [address]  [phone] (home)     :  1990    To Whom it May Concern:    Janice JEANETH Mendoza missed work on 10/17/2017 due to surgery. She may return to work on Tuesday, 10/23/17.    Please contact me for any questions or concerns.    Sincerely,      Dr. Estrada

## 2017-10-17 NOTE — ANESTHESIA PREPROCEDURE EVALUATION
Anesthesia Evaluation     . Pt has had prior anesthetic. Type: General    No history of anesthetic complications          ROS/MED HX    ENT/Pulmonary:     (+)tobacco use, Current use Intermittent asthma , . .    Neurologic:  - neg neurologic ROS     Cardiovascular:  - neg cardiovascular ROS       METS/Exercise Tolerance:  4 - Raking leaves, gardening   Hematologic:  - neg hematologic  ROS       Musculoskeletal:  - neg musculoskeletal ROS       GI/Hepatic:  - neg GI/hepatic ROS       Renal/Genitourinary:  - ROS Renal section negative       Endo:  - neg endo ROS       Psychiatric:  - neg psychiatric ROS       Infectious Disease:  - neg infectious disease ROS       Malignancy:      - no malignancy   Other:                     Physical Exam  Normal systems: dental    Airway   Mallampati: I  TM distance: >3 FB  Neck ROM: full    Dental     Cardiovascular   Rhythm and rate: regular and normal      Pulmonary    breath sounds clear to auscultation                        Anesthesia Plan      History & Physical Review  History and physical reviewed and following examination; no interval change.    ASA Status:  2 .        Plan for General and LMA with Intravenous induction. Maintenance will be TIVA.    PONV prophylaxis:  Ondansetron (or other 5HT-3) and Dexamethasone or Solumedrol  Consented for possible rescue block, will not block preemptively.       Postoperative Care  Postoperative pain management:  Oral pain medications and Multi-modal analgesia.      Consents  Anesthetic plan, risks, benefits and alternatives discussed with:  Patient or representative..                          .

## 2017-10-17 NOTE — IP AVS SNAPSHOT
MRN:[id number]                      After Visit Summary   10/17/2017    Janice Mendoza    MRN: [id number]           Thank you!     Thank you for choosing [facility] for your care. Our goal is always to provide you with excellent care. Hearing back from our patients is one way we can continue to improve our services. Please take a few minutes to complete the written survey that you may receive in the mail after you visit with us. Thank you!        Patient Information     Date Of Birth          1990        About your hospital stay     You were admitted on:  October 17, 2017 [facility]    You were discharged on:  October 17, 2017       Who to Call     For medical emergencies, please call 911.  For non-urgent questions about your medical care, please call your primary care provider or clinic, [phone]  For questions related to your surgery, please call your surgery clinic        Attending Provider     Provider Specialty    Jorge Estrada MD Orthopedics       Primary Care Provider Office Phone # Fax #    Gustavo Harrison PA-C [phone]      After Care Instructions      Diet as Tolerated       Return to diet before surgery, unless instructed otherwise.            Discharge Instructions       Review outpatient procedure discharge instructions with patient as directed by Provider            Follow-up Physical Therapy appointment       Therapy will be coordinate through clinic at your first followup visit. If you know that you are scheduled to start PT, it is ok to start prior to your first postop visit at approximately 1 week.            Ice to affected area       Ice pack to surgical site every 15 minutes per hour for 24 hours            No driving or operating machinery       While taking narcotics. OK to drive when off narcotics and patient is safe to drive.            Notify Provider       For signs and  symptoms of infection: Fever greater than 101, redness, swelling, heat at site, drainage, please call [phone] during daytime hours for urgent issues during nights or weekends call [phone] and ask for the orthopaedic resident on call.    Narcotic prescription refills can not be called and will not be provided on nights or weekends. Please contact my clinic during business hours at [phone] if you will require a refill.            Remove dressing - at 72 hours       Reapply bandaids if needed, ok to leave uncovered. Leave steristrips in place.            Return to clinic       F/u in clinic in 1-2 weeks as previously scheduled. If your followup appointment is not scheduled please call [phone].            Shower       OK to shower postop day 3, ok to get wet, no submerging wounds in a bath or pool.            Weight bearing - As tolerated                 Your next 10 appointments already scheduled     Oct 23, 2017  9:10 AM CDT   (Arrive by 8:55 AM)   RETURN KNEE with Jorge Estrada MD   [facility] ([facility])    [address] [place] [id number] [phone]              Further instructions from your care team       [facility]  Home Care Following Anesthesia  For 24 hours after surgery:  1. Get plenty of rest.  A responsible adult must stay with you for at least 24 hours after you leave the surgery center.  2. Do not drive or use heavy equipment.  If you have weakness or tingling, don't drive or use heavy equipment until this feeling goes away.   3. Do not drink alcohol.   4. Avoid strenuous or risky activities.  Ask for help when climbing stairs.  5. You may feel lightheaded.  IF so, sit for a few minutes before standing.  Have someone help you get up.   6. If you have nausea (feel sick to your stomach): Drink only clear liquids such as apple juice, ginger ale, broth or 7-Up.   Rest may also help.  Be sure to drink enough fluids.  Move to a regular diet as you feel able.   7. You may have a slight fever.  Call the doctor if your fever is over 100 F (37.7 C) (taken under the tongue) or lasts longer than 24 hours.  8. You may have a dry mouth, a sore throat, muscle aches or trouble sleeping. These should go away after 24 hours.  9. Do not make important or legal decisions.   If you use hormonal birth control (such as the pill, patch, ring or implants):  You will need a second form of birth control for 7 days (condoms, a diaphragm or contraceptive foam).  While in the surgery center, you received a medicine called Sugammadex.  Hormonal birth control (such as the pill, patch, ring or implants) will not work as well for a week after taking this medicine.         Tips for taking pain medications  To get the best pain relief possible, remember these points:    Take pain medications as directed, before pain becomes severe.    Pain medication can upset your stomach: taking it with food may help.    Constipation is a common side effect of pain medication. Drink plenty of  fluids.    Eat foods high in fiber. Take a stool softener if recommended by your doctor or pharmacist.    Do not drink alcohol, drive or operate machinery while taking pain medications.    Ask about other ways to control pain, such as with heat, ice or relaxation.    Tylenol/Acetaminophen Consumption  To help encourage the safe use of acetaminophen, the makers of TYLENOL  have lowered the maximum daily dose for single-ingredient Extra Strength TYLENOL  (acetaminophen) products sold in the [place] from 8 pills per day (4,000 mg) to 6 pills per day (3,000 mg). The dosing interval has also changed from 2 pills every 4-6 hours to 2 pills every 6 hours.    If you feel your pain relief is insufficient, you may take Tylenol/Acetaminophen in addition to your narcotic pain medication.     Be careful not to exceed 3,000 mg of  Tylenol/Acetaminophen in a 24 hour period from all sources.    If you are taking extra strength Tylenol/acetaminophen (500 mg), the maximum dose is 6 tablets in 24 hours.    If you are taking regular strength acetaminophen (325 mg), the maximum dose is 9 tablets in 24 hours.    Call a doctor for any of the followin. Signs of infection (fever, growing tenderness at the surgery site, a large amount of drainage or bleeding, severe pain, foul-smelling drainage, redness, swelling).  2. It has been over 8 to 10 hours since surgery and you are still not able to urinate (pass water).  3. Headache for over 24 hours.  Your doctor is:       Dr. Jorge Estrada, Orthopaedics: [phone]               Or dial [phone] and ask for the resident on call for:  Orthopaedics  For emergency care, call the:  [facility] Emergency Department: [phone] (TTY for hearing impaired: [phone])  Post Operative Instructions: Regional Anesthetic for Lower Extremity     General Information:   Regional anesthesia is when local anesthetic or  numbing  medication is injected around the nerves to anesthetize or  numb  the area supplied by that set of nerves. It is a type of analgesia used to control pain and decreases the need for narcotics following surgery.    Types of Regional Blocks:  Femoral: A block injected into the groin area of the operative leg of a patient having thigh or knee surgery.  Adductor Canal: A block injected into the mid thigh of the operative leg of a patient having knee or ankle surgery.  Popliteal or Distal Sciatic: A block injected into the back of the knee of the operative leg of patients having foot or ankle surgery.   Ankle:  An anesthetic medication is injected into the ankle of the operative leg of a patient having foot or toe surgery.     Procedure:   The type of anesthesia your doctor used to numb your leg will usually not wear off for 6-18 hours, but may last as long as 24 hours. You should be  "careful during that period, since it is possible to injure your leg without being aware of the injury. While your leg is numb you should:    Use crutches (minimal weight bearing until your motor and strength is completely back to normal)    Avoid striking or bumping your leg    Avoid extreme hot or cold    Discomfort:  You will have a tingling and prickly sensation in your leg as the feeling begins to return; you can also expect some discomfort. The amount of discomfort is unpredictable, but if you have more pain than can be controlled with pain medication, you should notify your physician.     Pain Medicine:   Begin taking your oral pain pills (if you have not already done so) before bedtime and during the night to avoid a sudden onset of pain as the block wears off.  Do not engage in drinking, driving, or hazardous occupations while taking pain medication.   .ucSafety Tips for Using Crutches    Crutch Fit:    Assume good standing posture with shoulders relaxed and crutch tips 6-8 inches out from the side of the foot.    The underarm pad should fall 2-3 fingers width below the armpit.    The handgrip is positioned level with the wrist to allow 30  flexion at the elbow.    Safety Tips:    Bear weight on your hands, not on your armpits.    Do not add extra padding to the underarm pad. This will, in effect, lengthen the crutches and increase risk of nerve injury.    Wear flat, properly fitting shoes. Do not walk in stocking feet, high heels or slippers.    Household hazards:  --Throw rugs should be removed from floors.  --Stairs should be cleared of obstacles.  --Use extra caution on slippery, highly polished, littered or uneven floor surfaces.  --Check for electric cords.    Check crutch tips for excessive wear and keep wing nuts tight.    While walking, look forward with  head up  and  eyes open.  Take equal length steps.    Use BOTH crutches.    Stairs Sequence:    UP: \"Good\" leg first, followed by  bad  leg, then " "crutches.    DOWN: Crutches, followed by  bad  leg, \"good\" leg.     Walking with Crutches:    Move both crutches forward at the same time.    Non-Weight Bearing (NWB):  Hold the involved leg up and swing through the crutches with the involved leg. The involved leg does not touch the floor.    Toe Touch Weight Bearing (TTWB): Move the involved leg forward. Rest it lightly on the floor for balance only. Step through the crutches with the uninvolved leg.    Partial Weight Bearing (PWB): Move the involved leg forward. Step down the weight of the leg only.  Step through the crutches with the uninvolved leg.    Weight Bearing As Tolerated (WBAT): Move the involved leg forward. Put as much pressure through the involved leg as you can tolerate comfortably. Then step through the crutches with the uninvolved leg.    [facility]  [facility] [address] [address]  [phone]  [phone]    10/17/2017    Janice ERIC Kelly  [address] [facility] [address]  [phone] (home)     :  1990    To Whom it May Concern:    Janice Mendoza missed school on 10/17/2017 due to surgery. May return to school Friday 10/20/17    Please contact me for any questions or concerns.    Sincerely,          [facility]  [facility] [address] [address]  [phone]  [phone]    10/17/2017    Janice ERIC Jenniferrose marie  [address] [facility] [address]  [phone] (home)     :  1990    To Whom it May Concern:    Janice Mendoza missed work on 10/17/2017 due to surgery. She may return to work on Tuesday, 10/23/17.    Please contact me for any questions or concerns.    Sincerely,      Dr. Estrada        Pending Results     Date and Time Order Name Status Description    10/17/2017 0906 XR " "SURGERY GILDARDO FLUORO LESS THAN 5 MIN W STILLS In process             Admission Information     Date & Time Provider Department Dept. Phone    10/17/2017 Jorge Estrada MD [facility] [phone]      Your Vitals Were     Blood Pressure Temperature Respirations Height Weight Last Period     97.8  F (36.6  C) (Temporal) 14 1.6 m (5' 3\") 88.5 kg (195 lb 3.2 oz) 2016 (Exact Date)    Pulse Oximetry BMI (Body Mass Index)                90% 34.58 kg/m2          [employer] Information     [employer] is an electronic gateway that provides easy, online access to your medical records. With [employer] Room, you can request a clinic appointment, read your test results, renew a prescription or communicate with your care team.     To sign up for [employer] Room visit the website at [email or web address]   You will be asked to enter the access code listed below, as well as some personal information. Please follow the directions to create your username and password.     Your access code is: [email or web address]  Expires: 2018  6:31 AM     Your access code will  in 90 days. If you need help or a new code, please contact your [facility] or call [phone] for assistance.        Care EveryWhere ID     This is your Care EveryWhere ID. This could be used by other organizations to access your [facility] medical records  [id number]        Equal Access to Services     DORA BONILLA AH: Hadii socorro nunezo Soyvrose, waaxda luqadaha, qaybta kaalmada adeegyada, waxay danya khan adeshantel del valle. So [facility] [phone].    ATENCIÓN: Si habla español, tiene a hannah disposición servicios gratuitos de asistencia lingüística. Llame al [phone].    We comply with applicable federal civil rights laws and [place] laws. We do not discriminate on the basis of race, color, national origin, age, disability, sex, sexual orientation, or gender identity.               Review of " your medicines      UNREVIEWED medicines. Ask your doctor about these medicines        Dose / Directions    albuterol 108 (90 BASE) MCG/ACT Inhaler   Commonly known as:  PROAIR HFA/PROVENTIL HFA/VENTOLIN HFA        Dose:  2 puff   Inhale 2 puffs into the lungs every 6 hours   Refills:  0       ALPRAZOLAM PO        Dose:  0.25 mg   Take 0.25 mg by mouth as needed for anxiety   Refills:  0       CALCIUM 500 PO        Refills:  0       CITALOPRAM HYDROBROMIDE PO        Dose:  10 mg   Take 10 mg by mouth daily   Refills:  0       VITAMIN B 12 PO        Take by mouth daily   Refills:  0         START taking        Dose / Directions    acetaminophen 325 MG tablet   Commonly known as:  TYLENOL   Used for:  Closed fracture of right tibial plateau with routine healing, subsequent encounter        Dose:  650 mg   Take 2 tablets (650 mg) by mouth every 4 hours as needed for other (mild pain)   Quantity:  100 tablet   Refills:  0       hydrOXYzine 25 MG tablet   Commonly known as:  ATARAX   Used for:  Closed fracture of right tibial plateau with routine healing, subsequent encounter        Dose:  25 mg   Take 1 tablet (25 mg) by mouth every 6 hours as needed for itching (and nausea)   Quantity:  30 tablet   Refills:  0       ondansetron 4 MG ODT tab   Commonly known as:  ZOFRAN-ODT   Used for:  Closed fracture of right tibial plateau with routine healing, subsequent encounter        Dose:  4-8 mg   Take 1-2 tablets (4-8 mg) by mouth every 8 hours as needed for nausea Dissolve ON the tongue.   Quantity:  4 tablet   Refills:  0       oxyCODONE 5 MG IR tablet   Commonly known as:  ROXICODONE   Used for:  Closed fracture of right tibial plateau with routine healing, subsequent encounter        Dose:  5 mg   Take 1 tablet (5 mg) by mouth every 4 hours as needed for pain or other (Moderate to Severe)   Quantity:  40 tablet   Refills:  0       senna-docusate 8.6-50 MG per tablet   Commonly known as:  SENOKOT-S;PERICOLACE   Used for:   Closed fracture of right tibial plateau with routine healing, subsequent encounter        Dose:  1-2 tablet   Take 1-2 tablets by mouth 2 times daily Take while on oral narcotics to prevent or treat constipation.   Quantity:  30 tablet   Refills:  0         CONTINUE these medicines which have NOT CHANGED        Dose / Directions    blood glucose monitoring lancets   Used for:  Hyperglycemia        Use to test blood sugar 4 times daily or as directed.   Quantity:  1 Box   Refills:  5       blood glucose monitoring meter device kit   Commonly known as:  no brand specified   Used for:  Hyperglycemia        Use to test blood sugar 4 times daily or as directed.   Quantity:  1 kit   Refills:  0       BLOOD GLUCOSE TEST STRIPS Strp   Used for:  Hyperglycemia        4 times a day check   Quantity:  100 each   Refills:  5            Where to get your medicines      These medications were sent to [facility] [address] [place] [address] [address] [place] [address] [address] [place] [address]    Hours:  TRANSPLANT PHONE NUMBER [phone] Phone:  [phone]     acetaminophen 325 MG tablet    hydrOXYzine 25 MG tablet    ondansetron 4 MG ODT tab    senna-docusate 8.6-50 MG per tablet         Some of these will need a paper prescription and others can be bought over the counter. Ask your nurse if you have questions.     Bring a paper prescription for each of these medications     oxyCODONE 5 MG IR tablet                Protect others around you: Learn how to safely use, store and throw away your medicines at www.disposemymeds.org.             Medication List: This is a list of all your medications and when to take them. Check marks below indicate your daily home schedule. Keep this list as a reference.      Medications           Morning Afternoon Evening Bedtime As Needed    acetaminophen 325 MG tablet   Commonly known as:  TYLENOL   Take 2 tablets (650 mg) by mouth every 4 hours as needed  for other (mild pain)   Last time this was given:  975 mg on 10/17/2017  8:17 AM                                albuterol 108 (90 BASE) MCG/ACT Inhaler   Commonly known as:  PROAIR HFA/PROVENTIL HFA/VENTOLIN HFA   Inhale 2 puffs into the lungs every 6 hours                                ALPRAZOLAM PO   Take 0.25 mg by mouth as needed for anxiety                                blood glucose monitoring lancets   Use to test blood sugar 4 times daily or as directed.                                blood glucose monitoring meter device kit   Commonly known as:  no brand specified   Use to test blood sugar 4 times daily or as directed.                                BLOOD GLUCOSE TEST STRIPS Strp   4 times a day check                                CALCIUM 500 PO                                CITALOPRAM HYDROBROMIDE PO   Take 10 mg by mouth daily                                hydrOXYzine 25 MG tablet   Commonly known as:  ATARAX   Take 1 tablet (25 mg) by mouth every 6 hours as needed for itching (and nausea)                                ondansetron 4 MG ODT tab   Commonly known as:  ZOFRAN-ODT   Take 1-2 tablets (4-8 mg) by mouth every 8 hours as needed for nausea Dissolve ON the tongue.                                oxyCODONE 5 MG IR tablet   Commonly known as:  ROXICODONE   Take 1 tablet (5 mg) by mouth every 4 hours as needed for pain or other (Moderate to Severe)   Last time this was given:  5 mg on 10/17/2017 12:42 PM                                senna-docusate 8.6-50 MG per tablet   Commonly known as:  SENOKOT-S;PERICOLACE   Take 1-2 tablets by mouth 2 times daily Take while on oral narcotics to prevent or treat constipation.                                VITAMIN B 12 PO   Take by mouth daily

## 2017-10-17 NOTE — BRIEF OP NOTE
[facility] Orthopedic Brief Operative Note    Pre-operative diagnosis: Retained Hardware, Tibial Plateau Fracture   Post-operative diagnosis: SAME   Procedure: Procedure(s):  ARTHROSCOPY KNEE WITH DEBRIDEMENT JOINT  REMOVE HARDWARE LOWER EXTREMITY     Surgeon: Jorge Estrada*    Assistant(s): Darius Mercer MD     Anesthesia: LMA   Estimated blood loss: 50   Total IV fluids: (See anesthesia record)     Drains:   None   Specimens: None   Implants: See dictated operative report for full details.    Findings: See dictated operative report for full details.    Complications: None     Disposition:   Stable to PACU     Plan:  WBAT, no brace. F/u 1 week. No running/jumping/pounding activities x6wks.

## 2017-10-17 NOTE — OP NOTE
PREOPERATIVE DIAGNOSES:   1.  Status post tibial plateau fracture, status post open reduction and internal fixation at outside hospital.   2.  Status post multi-ligament knee injury with reconstruction of ACL and posterolateral corner approximately 2 years ago.     3.  Known chondrosis of lateral compartment.      POSTOPERATIVE DIAGNOSES:   1.  Status post tibial plateau fracture treated with open reduction and internal fixation at outside hospital.   2.  Retained tibial plateau hardware.   3.  Status post ACL and posterolateral corner reconstruction with intact ACL and posterolateral corner graft based on examination.   4.  Known chondrosis of the lateral compartment, right knee.      PROCEDURES:     1.  Examination under anesthesia, right knee.   2.  Fluoroscopic examination under anesthesia, right knee.   3.  Right knee arthroscopy and chondroplasty of the lateral compartment, lateral femoral condyle and lateral tibial plateau, right knee.   4.  Hardware removal, deep, right proximal tibia.   5.  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        MODIFIER:  I am requesting a 22 modifier for this procedure, as the distal tibial hardware was cold-welded to the screw and was cold-welded to the tibial plate, requiring multiple advanced techniques for hardware removal, including carbide drill bit used, which nearly tripled the normal time of excision.      OPERATIVE INDICATIONS:  Janice Mendoza is a pleasant, 26-year-old female with a devastating tibial plateau fracture.  This was treated with open reduction and internal fixation at an outside hospital.  She then presented to see me in clinic with considerable instability about her knee.  She was found to have both an ACL as well as a posterolateral corner injury, and together through a combined decision-making approach, we elected to proceed with ACL and posterolateral corner reconstruction.  I  completed this procedure on 02/26/2016.  The patient tolerated this procedure well and has had good stability of her knee; however, she has noted the onset of pain about her knee.  In light of this, I saw her through my clinic and was somewhat struck by the progressive degenerative changes about her lateral compartment.  In light of this, I have offered her a staging arthroscopy to define a plan for future cartilage reconstruction.  We would also plan for removal of hardware from her tibia.  She understood that she may not see improvement of this.  She understood she may need future surgical reconstruction, including, but not limited to, knee replacement surgery.  I discussed with her the risks, benefits, complications, techniques and alternatives, and together through a combined decision-making approach, she elected to proceed.      OPERATIVE DETAILS:  In the preoperative area, the patient's informed consent was reviewed, and she desired to proceed.  The right knee was marked, and the patient was in agreement.  She was taken to the operating room where a time-out was performed, and all parties were in agreement.  Preoperative antibiotics were given within 1 hour of time of surgery.  She was placed supine on the operating room table, surrendered to LMA anesthesia, and examination under anesthesia was performed with the following findings:  1A Lachman, no opening to varus stress at 0 or 30 degrees, no opening to valgus.  Posterior drawer was intact.  At this time, a bump was placed underneath the ipsilateral hip, and an eggcrate was placed beneath the well leg.  The right leg was prepped and draped in the usual sterile fashion.  Anteromedial and anterolateral arthroscopic portals were created, and a diagnostic arthroscopy was performed with the following findings:  There were no loose bodies in the suprapatellar pouch, medial gutter or lateral gutter.  Medial femoral condyle, medial tibial plateau and medial meniscus  were intact.  ACL graft was intact, but it was somewhat increased in the arthroscopic drawer, though overall it was maintained.  Posterior cruciate ligament was intact.  Lateral compartment showed grade IV changes in the lateral femoral condyle, grade IV changes in the lateral tibial plateau.  Medial patellar facet, central ridge, lateral patellar facet and central trochlea appeared relatively normal with just some grade 1 softening.  At this time, a shaver was introduced, and a chondroplasty of the lateral compartment was performed until a balanced, stable rim of cartilage remained.  At this time, an extensive synovectomy of the intercondylar notch and debridement of some fibrosis along the ACL was performed consistent with a cyclops lesion.  At this time, the arthroscope was withdrawn, and the portal sites were closed with nylon.  The patient's old lateral-based incision was then opened and carried down through the skin and subcutaneous tissues.  The anterior compartment was reflected.  We identified the locking plate.  The screws were taken out proximally without any difficulty.  Two screws were noted deep to the plate, and these were also removed.  A C-arm was used to localize them.  We then turned our attention to the distal tibia, where we identified the 4 screws and localized our incision with the C-arm.  An incision was made, and the plate was identified.  The 3 proximal screws of the distal aspect of the tibia plate were removed without complication.  The most distal screw was cold-welded and could not be turned or loosened at all.  At this time, we had to open a carbide drill bit and ream out the head of the screw alongside the plate off over the top.  We then cored out the remaining screw underneath this.  The screw was taken out without complication, and copious irrigation was performed.  This was very difficult and nearly tripled the standard exposure of this plate removal.  At this time, we removed the  washer from the anterior cortex of the tibia.  We did remove the knot over the ACL button; however, we left the button intact in both the ACL and both the tibia and the femoral side.  At this time, we removed all of our hardware.  Copious irrigation was performed.  A layered closure was initiated with 0 Vicryl, 2-0 Vicryl and Monocryl.  Sterile dressings were applied.  The patient was awakened from anesthesia and transferred to the recovery room in stable condition with stable vital signs.      COMPLICATIONS:  None apparent.      DRAINS:  None.      SPECIMENS:  None.      ESTIMATED BLOOD LOSS:  25 mL      TOURNIQUET TIME:  None.  No tourniquet was placed.      POSTOPERATIVE PLAN:  The patient will be weightbearing as tolerated, range of motion as tolerated.  Wean from crutches when able.  No brace needed.  Follow up in my clinic in 1 week.  We will make a decision about therapy at that time.      I did discuss with her specifically that this may be a large cartilage reconstruction, and ultimately our choice will be either consideration of unicompartmental arthroplasty versus a tibial plateau/distal femoral osteochondral allograft transplantation.  We will need standing 6 foot alignment films, and we can get this at the 1 week jean carlos via my clinic; however, she does need to be able to  full view before we can obtain this.  She may need a distal femoral osteotomy.  Either way, prior to proceeding with cartilage reconstruction, we will need to get her to stop smoking.         MELVA FOURNIER MD             D: 10/17/2017 13:32   T: 10/17/2017 14:41   MT: rudy      Name:     GINGER GARCIA   MRN:      [id number]        Account:        [id number]   :      1990           Procedure Date: 10/17/2017      Document: [id number]

## 2017-10-17 NOTE — ANESTHESIA CARE TRANSFER NOTE
Patient: Janice Mendoza    Procedure(s):  Examination Under Anesthesia Right Knee, Right Knee Arthroscopy, Evaluation of Cartilage Surfaces and Staging for Future Reconstruction, Open Hardware Removal Right Leg   - Wound Class: I-Clean   - Wound Class: I-Clean    Diagnosis: Retained Hardware, Tibial Plateau Fracture  Diagnosis Additional Information: No value filed.    Anesthesia Type:   General, LMA     Note:  Airway :Face Mask  Patient transferred to:PACU  Comments: VSS and WNL, c/o pain in right leg, given 0.5mg hydromorphone IV with good relief, report to RNHandoff Report: Identifed the Patient, Identified the Reponsible Provider, Reviewed the pertinent medical history, Discussed the surgical course, Reviewed Intra-OP anesthesia mangement and issues during anesthesia, Set expectations for post-procedure period and Allowed opportunity for questions and acknowledgement of understanding      Vitals: (Last set prior to Anesthesia Care Transfer)    CRNA VITALS  10/17/2017 1148 - 10/17/2017 1225      10/17/2017             Pulse: 99    SpO2: 91 %    Resp Rate (observed): (!)  4                Electronically Signed By: RENNY Luna CRNA  October 17, 2017  12:25 PM

## 2017-10-17 NOTE — ANESTHESIA PROCEDURE NOTES
Peripheral Nerve Block Procedure Note    Staff:     Anesthesiologist:  RAE PETERSEN  Location: PACU  Procedure Start/Stop TImes:      10/17/2017 1:23 PM     10/17/2017 1:30 PM    patient identified, IV checked, site marked, risks and benefits discussed, informed consent, monitors and equipment checked, pre-op evaluation, at physician/surgeon's request and post-op pain management      Correct Patient: Yes      Correct Position: Yes      Correct Site: Yes      Correct Procedure: Yes      Correct Laterality:  Yes    Site Marked:  Yes  Procedure details:     Procedure:  Popliteal    ASA:  2    Laterality:  Right    Position:  Supine    Sterile Prep: chloraprep, mask and sterile gloves      Local skin infiltration:  None    Needle:  Short bevel and insulated    Needle gauge:  21    Needle length (inches):  3.1    Ultrasound: Yes      Ultrasound used to identify targeted nerve, plexus, or vascular structure and placed a needle adjacent to it      Permanent Image entered into patiient's record      Abnormal pain on injection: No      Blood Aspirated: No      Paresthesias:  No    Bleeding at site: No      Bolus via:  Needle    Infusion Method:  Single Shot    Complications:  None  Assessment/Narrative:     Injection made incrementally with aspirations every (mL):  5

## 2017-10-17 NOTE — ANESTHESIA POSTPROCEDURE EVALUATION
Patient: Janice Mendoza    Procedure(s):  Examination Under Anesthesia Right Knee, Right Knee Arthroscopy, Evaluation of Cartilage Surfaces and Staging for Future Reconstruction, Open Hardware Removal Right Leg   - Wound Class: I-Clean   - Wound Class: I-Clean    Diagnosis:Retained Hardware, Tibial Plateau Fracture  Diagnosis Additional Information: No value filed.    Anesthesia Type:  General, LMA    Note:  Anesthesia Post Evaluation    Patient location during evaluation: PACU  Patient participation: Able to participate in evaluation but full recovery from regional anesthesia has not yet ocurrred but is anticipated to occur within 48 hours  Level of consciousness: awake and alert  Pain management: adequate  Airway patency: patent  Cardiovascular status: hemodynamically stable  Respiratory status: nonlabored ventilation, spontaneous ventilation and room air  Hydration status: euvolemic  PONV: none     Anesthetic complications: None          Last vitals:  Vitals:    10/17/17 1320 10/17/17 1323 10/17/17 1352   BP: 120/86 126/87 121/77   Resp: 18 17 14   Temp:   36.6  C (97.8  F)   SpO2: 94% 97% 90%         Electronically Signed By: Obey Diego MD  October 17, 2017  1:57 PM

## 2017-10-18 DIAGNOSIS — Z98.890 S/P RIGHT KNEE SURGERY: Primary | ICD-10-CM

## 2017-10-23 ENCOUNTER — OFFICE VISIT (OUTPATIENT)
Dept: ORTHOPEDICS | Facility: CLINIC | Age: 27
End: 2017-10-23

## 2017-10-23 DIAGNOSIS — G89.29 CHRONIC PAIN OF RIGHT KNEE: Primary | ICD-10-CM

## 2017-10-23 DIAGNOSIS — M25.561 CHRONIC PAIN OF RIGHT KNEE: Primary | ICD-10-CM

## 2017-10-23 ASSESSMENT — ENCOUNTER SYMPTOMS
ARTHRALGIAS: 1
JOINT SWELLING: 1
BACK PAIN: 0
STIFFNESS: 1
MUSCLE CRAMPS: 1
NECK PAIN: 0
MUSCLE WEAKNESS: 1
MYALGIAS: 1

## 2017-10-23 NOTE — PROGRESS NOTES
HISTORY OF PRESENT ILLNESS:  Armando is a pleasant 26-year-old female now 1 week status post hardware removal of a right proximal tibia plate on 10/17/2017 with Dr. Estrada as well as a left knee scope.  She has a complicated history of a right tibial plateau fracture fixed at an outside hospital and a multi-leg knee reconstruction.  She is doing well.  She is off pain medications.  She has some numbness in her foot which is improving.  She has some hypersensitivity over the distal interlock screw sites.      PHYSICAL EXAMINATION:  Pleasant age appropriate female in no acute distress.   RESPIRATORY:  Nonlabored.   FOCUSED EXAMINATION:  Of her knee reveals a moderate effusion with normal postoperative swelling.  Sensation is intact in sural, saphenous, deep peroneal, superficial peroneal and tibial nerve distributions.  2+ DP, PT.  Fires EHL, FHL, gastrocsoleus complex and tibialis anterior with 5/5 strength.  Incision is well approximated without drainage or erythema.  Gentle arc range of motion from 0-90 degrees without pain.      IMAGING:  X-rays of the left knee show interval removal of the plate.  Degenerative changes in the lateral compartment are again appreciated.  Standing alignment films reveal weightbearing line passing through the lateral plateau with a mechanical axis valgus angle of approximately 8 degrees.      IMPRESSION:  26-year-old female status post right tibial plateau fracture now 1 week status post plate removal.  At this time, we will see how she does after surgery.  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  Advised her to avoid running impact activities for 3 months after the surgery.  She will return when she wishes to pursue this next option.      The patient was seen and examined with Dr. Estrada who agrees with the assessment and plan.         Answers for HPI/ROS submitted by the patient on  10/23/2017   General Symptoms: No  Skin Symptoms: No  HENT Symptoms: No  EYE SYMPTOMS: No  HEART SYMPTOMS: No  LUNG SYMPTOMS: No  INTESTINAL SYMPTOMS: No  URINARY SYMPTOMS: No  GYNECOLOGIC SYMPTOMS: No  BREAST SYMPTOMS: No  SKELETAL SYMPTOMS: Yes  BLOOD SYMPTOMS: No  NERVOUS SYSTEM SYMPTOMS: No  MENTAL HEALTH SYMPTOMS: No  Back pain: No  Muscle aches: Yes  Neck pain: No  Swollen joints: Yes  Joint pain: Yes  Bone pain: Yes  Muscle cramps: Yes  Muscle weakness: Yes  Joint stiffness: Yes  Bone fracture: No

## 2017-10-23 NOTE — PROGRESS NOTES
Patient seen and examined with the resident.     Assesment: 1 week following knee arthroscopy and hardware removal    Plan: Standing alignment films demonstrate valgus alignment - Surgical plan is for DFO (if she wants, which will not be for this year)    Hardware removal and provided, images reviewed, sutures removed. F/u when wants dfo    I agree with history, physical and imaging as well as the assessment and plan as detailed by Dr. Mercer.

## 2017-10-23 NOTE — LETTER
10/23/2017       RE: Janice Mendoza  [address] [facility] [address]     Dear Colleague,    Thank you for referring your patient, Janice Mendoza, to the [facility] at [facility]. Please see a copy of my visit note below.    HISTORY OF PRESENT ILLNESS:  Armando is a pleasant 26-year-old female now 1 week status post hardware removal of a right proximal tibia plate on 10/17/2017 with Dr. Estrada as well as a left knee scope.  She has a complicated history of a right tibial plateau fracture fixed at an outside hospital and a multi-leg knee reconstruction.  She is doing well.  She is off pain medications.  She has some numbness in her foot which is improving.  She has some hypersensitivity over the distal interlock screw sites.      PHYSICAL EXAMINATION:  Wilmer age appropriate female in no acute distress.   RESPIRATORY:  Nonlabored.   FOCUSED EXAMINATION:  Of her knee reveals a moderate effusion with normal postoperative swelling.  Sensation is intact in sural, saphenous, deep peroneal, superficial peroneal and tibial nerve distributions.  2+ DP, PT.  Fires EHL, FHL, gastrocsoleus complex and tibialis anterior with 5/5 strength.  Incision is well approximated without drainage or erythema.  Gentle arc range of motion from 0-90 degrees without pain.      IMAGING:  X-rays of the left knee show interval removal of the plate.  Degenerative changes in the lateral compartment are again appreciated.  Standing alignment films reveal weightbearing line passing through the lateral plateau with a mechanical axis valgus angle of approximately 8 degrees.      IMPRESSION:  26-year-old female status post right tibial plateau fracture now 1 week status post plate removal.  At this time, we will see how she does after surgery.  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  Advised  her to avoid running impact activities for 3 months after the surgery.  She will return when she wishes to pursue this next option.      The patient was seen and examined with Dr. Estrada who agrees with the assessment and plan.     Patient seen and examined with the resident.     Assesment: 1 week following knee arthroscopy and hardware removal    Plan: Standing alignment films demonstrate valgus alignment - Surgical plan is for DFO (if she wants, which will not be for this year)    Hardware removal and provided, images reviewed, sutures removed. F/u when wants dfo    I agree with history, physical and imaging as well as the assessment and plan as detailed by Dr. Mercer.     Sincerely,    Jorge Estrada MD

## 2017-10-23 NOTE — MR AVS SNAPSHOT
After Visit Summary   10/23/2017    Janice Mendoza    MRN: [id number]           Patient Information     Date Of Birth          1990        Visit Information        Provider Department      10/23/2017 9:10 AM Jorge Estrada MD [facility]        Today's Diagnoses     Chronic pain of right knee    -  1       Follow-ups after your visit        Who to contact     Please call your clinic at [phone] to:    Ask questions about your health    Make or cancel appointments    Discuss your medicines    Learn about your test results    Speak to your doctor   If you have compliments or concerns about an experience at your clinic, or if you wish to file a complaint, please contact [facility] Patient Relations at [phone] or email us at [email or web address]         Additional Information About Your Visit        MyChart Information     [employer] gives you secure access to your electronic health record. If you see a primary care provider, you can also send messages to your care team and make appointments. If you have questions, please call your primary care clinic.  If you do not have a primary care provider, please call [phone] and they will assist you.      [employer] is an electronic gateway that provides easy, online access to your medical records. With [employer], you can request a clinic appointment, read your test results, renew a prescription or communicate with your care team.     To access your existing account, please contact your [facility] or call [phone] for assistance.        Care EveryWhere ID     This is your Care EveryWhere ID. This could be used by other organizations to access your [place] medical records  [id number]        Your Vitals Were     Last Period                   02/19/2016 (Exact Date)            Blood Pressure from Last 3 Encounters:   10/17/17 121/77   12/27/16  108/74   03/29/16 109/62    Weight from Last 3 Encounters:   10/17/17 88.5 kg (195 lb 3.2 oz)   10/16/17 88.6 kg (195 lb 5.2 oz)   01/20/17 86.5 kg (190 lb 9.6 oz)              Today, you had the following     No orders found for display         Today's Medication Changes          These changes are accurate as of: 10/23/17 11:59 PM.  If you have any questions, ask your nurse or doctor.               Stop taking these medicines if you haven't already. Please contact your care team if you have questions.     senna-docusate 8.6-50 MG per tablet   Commonly known as:  SENOKOT-S;PERICOLACE   Stopped by:  Jorge Estrada MD                    Primary Care Provider Office Phone # Fax #    Gustavo Harrison PA-C [phone]       [facility] [place] [address]  [place] [address]        Equal Access to Services     [facility] AH: Hadii aad ku hadasho Soomaali, waaxda luqadaha, qaybta kaalmada adeegyada, waxay idiin hayaan adeeg kharash la'lul . So [facility] [phone].    ATENCIÓN: Si habla español, tiene a hannah disposición servicios gratuitos de asistencia lingüística. Llame al [phone].    We comply with applicable federal civil rights laws and [place] laws. We do not discriminate on the basis of race, color, national origin, age, disability, sex, sexual orientation, or gender identity.            Thank you!     Thank you for choosing [facility]  for your care. Our goal is always to provide you with excellent care. Hearing back from our patients is one way we can continue to improve our services. Please take a few minutes to complete the written survey that you may receive in the mail after your visit with us. Thank you!             Your Updated Medication List - Protect others around you: Learn how to safely use, store and throw away your medicines at www.disposemymeds.org.          This list is accurate as of: 10/23/17 11:59 PM.  Always use your most recent med list.                    Brand Name Dispense Instructions for use Diagnosis    acetaminophen 325 MG tablet    TYLENOL    100 tablet    Take 2 tablets (650 mg) by mouth every 4 hours as needed for other (mild pain)    Closed fracture of right tibial plateau with routine healing, subsequent encounter       albuterol 108 (90 BASE) MCG/ACT Inhaler    PROAIR HFA/PROVENTIL HFA/VENTOLIN HFA     Inhale 2 puffs into the lungs every 6 hours        ALPRAZOLAM PO      Take 0.25 mg by mouth as needed for anxiety        blood glucose monitoring lancets     1 Box    Use to test blood sugar 4 times daily or as directed.    Hyperglycemia       blood glucose monitoring meter device kit    no brand specified    1 kit    Use to test blood sugar 4 times daily or as directed.    Hyperglycemia       BLOOD GLUCOSE TEST STRIPS Strp     100 each    4 times a day check    Hyperglycemia       CALCIUM 500 PO           CITALOPRAM HYDROBROMIDE PO      Take 10 mg by mouth daily        hydrOXYzine 25 MG tablet    ATARAX    30 tablet    Take 1 tablet (25 mg) by mouth every 6 hours as needed for itching (and nausea)    Closed fracture of right tibial plateau with routine healing, subsequent encounter       ondansetron 4 MG ODT tab    ZOFRAN-ODT    4 tablet    Take 1-2 tablets (4-8 mg) by mouth every 8 hours as needed for nausea Dissolve ON the tongue.    Closed fracture of right tibial plateau with routine healing, subsequent encounter       oxyCODONE 5 MG IR tablet    ROXICODONE    40 tablet    Take 1 tablet (5 mg) by mouth every 4 hours as needed for pain or other (Moderate to Severe)    Closed fracture of right tibial plateau with routine healing, subsequent encounter       VITAMIN B 12 PO      Take by mouth daily

## 2017-10-29 ENCOUNTER — HEALTH MAINTENANCE LETTER (OUTPATIENT)
Age: 27
End: 2017-10-29

## 2018-01-15 ENCOUNTER — OFFICE VISIT (OUTPATIENT)
Dept: ORTHOPEDICS | Facility: CLINIC | Age: 28
End: 2018-01-15
Payer: COMMERCIAL

## 2018-01-15 DIAGNOSIS — M21.061 GENU VALGUM, ACQUIRED, RIGHT: Primary | ICD-10-CM

## 2018-01-15 ASSESSMENT — ENCOUNTER SYMPTOMS
ARTHRALGIAS: 1
MYALGIAS: 1
DYSPNEA ON EXERTION: 1
POSTURAL DYSPNEA: 0
MUSCLE CRAMPS: 0
HEMOPTYSIS: 0
COUGH DISTURBING SLEEP: 0
SHORTNESS OF BREATH: 1
JOINT SWELLING: 1
NECK PAIN: 0
BACK PAIN: 1
SPUTUM PRODUCTION: 0
WHEEZING: 1
SNORES LOUDLY: 1
MUSCLE WEAKNESS: 0
STIFFNESS: 0
COUGH: 0

## 2018-01-15 NOTE — NURSING NOTE
Reason For Visit:   Chief Complaint   Patient presents with     Surgical Followup     DOS 10/17/17 Examination Under Anesthesia Right Knee, Right Knee Arthroscopy, Evaluation of Cartilage Surfaces and Staging for Future Reconstruction, Open Hardware Removal Right Leg        ?  No  Date of injury: NA, bothersome for 2 days  Type of injury: Shoveling snow, twisted the knee a few times. .  Date of surgery: 10/17/17  Type of surgery: Examination Under Anesthesia Right Knee, Right Knee Arthroscopy, Evaluation of Cartilage Surfaces and Staging for Future Reconstruction, Open Hardware Removal Right Leg .  Smoker: Yes  Request smoking cessation information: No    Pain Assessment  Patient Currently in Pain: Yes  0-10 Pain Scale: 6  Primary Pain Location: Knee  Pain Orientation: Right

## 2018-01-15 NOTE — LETTER
1/15/2018       RE: Janice Wade  [address] [facility] [address]     Dear Colleague,    Thank you for referring your patient, Janice Wade, to the [facility] at [facility]. Please see a copy of my visit note below.    Armando returns to my clinic today for interval follow-up she is a pleasant 27-year-old female well known to me she sustained a devastating tibial plateau fracture, treated with open reduction internal fixation at an outside hospital she was referred to me for treatment of her ACL and posterior lateral corner injury.  I reconstructed this though she is drifted into genu valgum malalignment as her tibial plateau fracture has settled.  I performed a staging arthroscopy and hardware removal in October 2017.  She notes continued symptomatology and we have made plans at our next step will be for a distal femoral osteotomy to convert her from genu valgum malalignment to neutral alignment.    She presents to clinic today to arrange this plan.     She will reports continued lateral sided pain, no pain medially.  No instability.    Examination: Shows full range of motion of her knee.  Trace pivot shift, 1A Lachman.  No opening full extension trace opening at 20 .  Neurovascularly intact distally.  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  ACL and posterior lateral corner status post reconstruction    Plan: This time I offered him a sample a distal femoral osteotomy.  Reviewed with her her diagnosis and potential treatment options we reviewed the expected course of recovery alternative treatment options.  She understands the expected course of recovery that this will be a long haul.  It will need to limit her weightbearing the first 6 weeks.  Another significant risks associated with this including but not limited to fracture, refracture, nonunion, continued  symptoms, need for future knee replacement surgery.    I discussed with her the risks benefits complications techniques and alternatives to surgery we reviewed the expected course of recovery and also treatment options together through a combined decision making approach we have elected to proceed.  I will look for the time of surgery.    Again, thank you for allowing me to participate in the care of your patient.      Sincerely,    Jorge Estrada MD

## 2018-01-15 NOTE — NURSING NOTE
Teaching Flowsheet   Relevant Diagnosis: EUA Right knee, Right knee arthroscopy, distal femoral osteotomy  Teaching Topic: Pre and Post Operative Care     Person(s) involved in teaching:   Patient and family member      Motivation Level:  Asks Questions: Yes  Eager to Learn: Yes  Cooperative: Yes  Receptive (willing/able to accept information): Yes  Any cultural factors/Scientologist beliefs that may influence understanding or compliance? No  Comments:      Patient and Family demonstrates understanding of the following:  Reason for the appointment, diagnosis and treatment plan: Yes  Knowledge of proper use of medications and conditions for which they are ordered (with special attention to potential side effects or drug interactions): Yes  Which situations necessitate calling provider and whom to contact: Yes     Teaching Concerns Addressed:   Comments: Pre and Post Operative Care     Proper use and care of brace (medical equip, care aids, etc.): No, explain: will be supplied at surgery  Nutritional needs and diet plan: NA  Pain management techniques: Yes  Wound Care: Yes  How and/when to access community resources: NA     Instructional Materials Used/Given: Jorge p     Time spent with patient: 15 minutes.    **Elaine spoke to patient in room, scheduled procedure for 2/13/18, dependent on patient's smoking status**  **Medical history includes asthma (knows to bring inhaler day of surgery), MRSA in 2012 (tested negative in last 3 tests)**    a. Does the patient take five or more prescription medications? No  b. Does patient have difficulty walking up two flights of stairs? No  c. Is patient s BMI 35 or greater? No  d. Is patient an insulin dependent diabetic? No  e. Does patient have a history of having a heart attack or a heart surgery of any kind? No  f. Does patient have a history of heart failure? No  g. Does the patient have a history of any type of transplant? No  h. Does the patient use inhalers on a daily basis?  Yes  i. Does the patient have a history of pulmonary hypertension? No  Once the patient is evaluated by PAC contact (ex: Surgery schedulers name and number, RN's name and number, etc..);  Elaine [phone]  Name of planned surgery______________________________

## 2018-01-15 NOTE — PROGRESS NOTES
Armando returns to my clinic today for interval follow-up she is a pleasant 27-year-old female well known to me she sustained a devastating tibial plateau fracture, treated with open reduction internal fixation at an outside hospital she was referred to me for treatment of her ACL and posterior lateral corner injury.  I reconstructed this though she is drifted into genu valgum malalignment as her tibial plateau fracture has settled.  I performed a staging arthroscopy and hardware removal in October 2017.  She notes continued symptomatology and we have made plans at our next step will be for a distal femoral osteotomy to convert her from genu valgum malalignment to neutral alignment.    She presents to clinic today to arrange this plan.     She will reports continued lateral sided pain, no pain medially.  No instability.    Examination: Shows full range of motion of her knee.  Trace pivot shift, 1A Lachman.  No opening full extension trace opening at 20 .  Neurovascularly intact distally.  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  ACL and posterior lateral corner status post reconstruction    Plan: This time I offered him a sample a distal femoral osteotomy.  Reviewed with her her diagnosis and potential treatment options we reviewed the expected course of recovery alternative treatment options.  She understands the expected course of recovery that this will be a long haul.  It will need to limit her weightbearing the first 6 weeks.  Another significant risks associated with this including but not limited to fracture, refracture, nonunion, continued symptoms, need for future knee replacement surgery.    I discussed with her the risks benefits complications techniques and alternatives to surgery we reviewed the expected course of recovery and also treatment options together through a combined decision making approach we have elected to  proceed.  I will look for the time of surgery.

## 2018-01-15 NOTE — MR AVS SNAPSHOT
After Visit Summary   1/15/2018    Janice Wade    MRN: [id number]           Patient Information     Date Of Birth          1990        Visit Information        Provider Department      1/15/2018 12:30 PM Jorge Estrada MD [facility]        Today's Diagnoses     Constanza joyce, acquired, right    -  1       Follow-ups after your visit        Who to contact     Please call your clinic at [phone] to:    Ask questions about your health    Make or cancel appointments    Discuss your medicines    Learn about your test results    Speak to your doctor   If you have compliments or concerns about an experience at your clinic, or if you wish to file a complaint, please contact [facility] Patient Relations at [phone] or email us at [email or web address]         Additional Information About Your Visit        MyChart Information     [employer] gives you secure access to your electronic health record. If you see a primary care provider, you can also send messages to your care team and make appointments. If you have questions, please call your primary care clinic.  If you do not have a primary care provider, please call [phone] and they will assist you.      Chug is an electronic gateway that provides easy, online access to your medical records. With Chug, you can request a clinic appointment, read your test results, renew a prescription or communicate with your care team.     To access your existing account, please contact your [facility] or call [phone] for assistance.        Care EveryWhere ID     This is your Care EveryWhere ID. This could be used by other organizations to access your [facility] medical records  [id number]        Your Vitals Were     Last Period                   02/19/2016 (Exact Date)            Blood Pressure from Last 3 Encounters:   10/17/17 121/77   12/27/16  108/74   03/29/16 109/62    Weight from Last 3 Encounters:   10/17/17 195 lb 3.2 oz (88.5 kg)   10/16/17 195 lb 5.2 oz (88.6 kg)   01/20/17 190 lb 9.6 oz (86.5 kg)              We Performed the Following     Pricilla-Operative Worksheet        Primary Care Provider Office Phone # Fax #    Gustavo Harrison PA-C [phone]       [facility] [address]  [place] [address]        Equal Access to Services     [facility]: Hadii aad ku hadasho Soomaali, waaxda luqadaha, qaybta kaalmada adeegyada, waxay idiin hayaan sascha bullock . So [facility] [phone].    ATENCIÓN: Si habla español, tiene a hannah disposición servicios gratuitos de asistencia lingüística. [facility] [phone].    We comply with applicable federal civil rights laws and [place] laws. We do not discriminate on the basis of race, color, national origin, age, disability, sex, sexual orientation, or gender identity.            Thank you!     Thank you for choosing [facility]  for your care. Our goal is always to provide you with excellent care. Hearing back from our patients is one way we can continue to improve our services. Please take a few minutes to complete the written survey that you may receive in the mail after your visit with us. Thank you!             Your Updated Medication List - Protect others around you: Learn how to safely use, store and throw away your medicines at www.disposemymeds.org.          This list is accurate as of: 1/15/18  1:12 PM.  Always use your most recent med list.                   Brand Name Dispense Instructions for use Diagnosis    acetaminophen 325 MG tablet    TYLENOL    100 tablet    Take 2 tablets (650 mg) by mouth every 4 hours as needed for other (mild pain)    Closed fracture of right tibial plateau with routine healing, subsequent encounter       albuterol 108 (90 BASE) MCG/ACT Inhaler    PROAIR HFA/PROVENTIL HFA/VENTOLIN HFA     Inhale 2 puffs into the lungs every 6  hours        ALPRAZOLAM PO      Take 0.25 mg by mouth as needed for anxiety        blood glucose monitoring lancets     1 Box    Use to test blood sugar 4 times daily or as directed.    Hyperglycemia       blood glucose monitoring meter device kit    no brand specified    1 kit    Use to test blood sugar 4 times daily or as directed.    Hyperglycemia       BLOOD GLUCOSE TEST STRIPS Strp     100 each    4 times a day check    Hyperglycemia       CALCIUM 500 PO           CITALOPRAM HYDROBROMIDE PO      Take 10 mg by mouth daily        hydrOXYzine 25 MG tablet    ATARAX    30 tablet    Take 1 tablet (25 mg) by mouth every 6 hours as needed for itching (and nausea)    Closed fracture of right tibial plateau with routine healing, subsequent encounter       ondansetron 4 MG ODT tab    ZOFRAN-ODT    4 tablet    Take 1-2 tablets (4-8 mg) by mouth every 8 hours as needed for nausea Dissolve ON the tongue.    Closed fracture of right tibial plateau with routine healing, subsequent encounter       oxyCODONE IR 5 MG tablet    ROXICODONE    40 tablet    Take 1 tablet (5 mg) by mouth every 4 hours as needed for pain or other (Moderate to Severe)    Closed fracture of right tibial plateau with routine healing, subsequent encounter       VITAMIN B 12 PO      Take by mouth daily

## 2018-02-13 ENCOUNTER — HOSPITAL ENCOUNTER (INPATIENT)
Facility: CLINIC | Age: 28
LOS: 2 days | Discharge: HOME | End: 2018-02-15
Attending: ORTHOPAEDIC SURGERY | Admitting: ORTHOPAEDIC SURGERY
Payer: COMMERCIAL

## 2018-02-13 ENCOUNTER — ANESTHESIA EVENT (OUTPATIENT)
Dept: SURGERY | Facility: CLINIC | Age: 28
End: 2018-02-13
Payer: COMMERCIAL

## 2018-02-13 ENCOUNTER — APPOINTMENT (OUTPATIENT)
Dept: GENERAL RADIOLOGY | Facility: CLINIC | Age: 28
End: 2018-02-13
Attending: ORTHOPAEDIC SURGERY
Payer: COMMERCIAL

## 2018-02-13 ENCOUNTER — ANESTHESIA (OUTPATIENT)
Dept: SURGERY | Facility: CLINIC | Age: 28
End: 2018-02-13
Payer: COMMERCIAL

## 2018-02-13 DIAGNOSIS — M17.31 POST-TRAUMATIC OSTEOARTHRITIS OF RIGHT KNEE: Primary | ICD-10-CM

## 2018-02-13 PROBLEM — M17.9 OSTEOARTHRITIS, KNEE: Status: ACTIVE | Noted: 2018-02-13

## 2018-02-13 LAB
ABO + RH BLD: NORMAL
ABO + RH BLD: NORMAL
BLD GP AB SCN SERPL QL: NORMAL
BLOOD BANK CMNT PATIENT-IMP: NORMAL
GLUCOSE BLDC GLUCOMTR-MCNC: 96 MG/DL (ref 70–99)
SPECIMEN EXP DATE BLD: NORMAL

## 2018-02-13 PROCEDURE — 71000014 ZZH RECOVERY PHASE 1 LEVEL 2 FIRST HR: Performed by: ORTHOPAEDIC SURGERY

## 2018-02-13 PROCEDURE — 86850 RBC ANTIBODY SCREEN: CPT | Performed by: ANESTHESIOLOGY

## 2018-02-13 PROCEDURE — 25000128 H RX IP 250 OP 636: Performed by: ANESTHESIOLOGY

## 2018-02-13 PROCEDURE — 37000009 ZZH ANESTHESIA TECHNICAL FEE, EACH ADDTL 15 MIN: Performed by: ORTHOPAEDIC SURGERY

## 2018-02-13 PROCEDURE — 0SBC4ZZ EXCISION OF RIGHT KNEE JOINT, PERCUTANEOUS ENDOSCOPIC APPROACH: ICD-10-PCS | Performed by: ORTHOPAEDIC SURGERY

## 2018-02-13 PROCEDURE — C1762 CONN TISS, HUMAN(INC FASCIA): HCPCS | Performed by: ORTHOPAEDIC SURGERY

## 2018-02-13 PROCEDURE — 0QUG0KZ SUPPLEMENT RIGHT TIBIA WITH NONAUTOLOGOUS TISSUE SUBSTITUTE, OPEN APPROACH: ICD-10-PCS | Performed by: ORTHOPAEDIC SURGERY

## 2018-02-13 PROCEDURE — 25000128 H RX IP 250 OP 636: Performed by: ORTHOPAEDIC SURGERY

## 2018-02-13 PROCEDURE — 27210995 ZZH RX 272: Performed by: ORTHOPAEDIC SURGERY

## 2018-02-13 PROCEDURE — C9290 INJ, BUPIVACAINE LIPOSOME: HCPCS | Performed by: ANESTHESIOLOGY

## 2018-02-13 PROCEDURE — 71000015 ZZH RECOVERY PHASE 1 LEVEL 2 EA ADDTL HR: Performed by: ORTHOPAEDIC SURGERY

## 2018-02-13 PROCEDURE — 25000125 ZZHC RX 250: Performed by: REGISTERED NURSE

## 2018-02-13 PROCEDURE — 37000008 ZZH ANESTHESIA TECHNICAL FEE, 1ST 30 MIN: Performed by: ORTHOPAEDIC SURGERY

## 2018-02-13 PROCEDURE — 0QSG04Z REPOSITION RIGHT TIBIA WITH INTERNAL FIXATION DEVICE, OPEN APPROACH: ICD-10-PCS | Performed by: ORTHOPAEDIC SURGERY

## 2018-02-13 PROCEDURE — 36000066 ZZH SURGERY LEVEL 4 W FLUORO 1ST 30 MIN - UMMC: Performed by: ORTHOPAEDIC SURGERY

## 2018-02-13 PROCEDURE — 25800025 ZZH RX 258: Performed by: ORTHOPAEDIC SURGERY

## 2018-02-13 PROCEDURE — 25000132 ZZH RX MED GY IP 250 OP 250 PS 637: Performed by: ORTHOPAEDIC SURGERY

## 2018-02-13 PROCEDURE — 27210794 ZZH OR GENERAL SUPPLY STERILE: Performed by: ORTHOPAEDIC SURGERY

## 2018-02-13 PROCEDURE — 36000064 ZZH SURGERY LEVEL 4 EA 15 ADDTL MIN - UMMC: Performed by: ORTHOPAEDIC SURGERY

## 2018-02-13 PROCEDURE — 40000278 XR SURGERY CARM FLUORO LESS THAN 5 MIN: Mod: TC

## 2018-02-13 PROCEDURE — 86900 BLOOD TYPING SEROLOGIC ABO: CPT | Performed by: ANESTHESIOLOGY

## 2018-02-13 PROCEDURE — 0QPG04Z REMOVAL OF INTERNAL FIXATION DEVICE FROM RIGHT TIBIA, OPEN APPROACH: ICD-10-PCS | Performed by: ORTHOPAEDIC SURGERY

## 2018-02-13 PROCEDURE — 86901 BLOOD TYPING SEROLOGIC RH(D): CPT | Performed by: ANESTHESIOLOGY

## 2018-02-13 PROCEDURE — 27211024 ZZHC OR SUPPLY OTHER OPNP: Performed by: ORTHOPAEDIC SURGERY

## 2018-02-13 PROCEDURE — 25000125 ZZHC RX 250: Performed by: ORTHOPAEDIC SURGERY

## 2018-02-13 PROCEDURE — 40000170 ZZH STATISTIC PRE-PROCEDURE ASSESSMENT II: Performed by: ORTHOPAEDIC SURGERY

## 2018-02-13 PROCEDURE — C1713 ANCHOR/SCREW BN/BN,TIS/BN: HCPCS | Performed by: ORTHOPAEDIC SURGERY

## 2018-02-13 PROCEDURE — 36415 COLL VENOUS BLD VENIPUNCTURE: CPT | Performed by: ANESTHESIOLOGY

## 2018-02-13 PROCEDURE — 12000001 ZZH R&B MED SURG/OB UMMC

## 2018-02-13 PROCEDURE — 00000146 ZZHCL STATISTIC GLUCOSE BY METER IP

## 2018-02-13 PROCEDURE — 25000128 H RX IP 250 OP 636: Performed by: REGISTERED NURSE

## 2018-02-13 DEVICE — GRAFT BONE PUTTY DBX 10ML 038100: Type: IMPLANTABLE DEVICE | Site: LEG | Status: FUNCTIONAL

## 2018-02-13 DEVICE — IMPLANTABLE DEVICE: Type: IMPLANTABLE DEVICE | Site: LEG | Status: FUNCTIONAL

## 2018-02-13 DEVICE — GRAFT BONE CRUSH CANC 15ML 400075: Type: IMPLANTABLE DEVICE | Site: LEG | Status: FUNCTIONAL

## 2018-02-13 RX ORDER — METOCLOPRAMIDE HYDROCHLORIDE 5 MG/ML
10 INJECTION INTRAMUSCULAR; INTRAVENOUS EVERY 6 HOURS PRN
Status: DISCONTINUED | OUTPATIENT
Start: 2018-02-13 | End: 2018-02-15 | Stop reason: HOSPADM

## 2018-02-13 RX ORDER — ALPRAZOLAM 0.25 MG
0.25 TABLET ORAL 3 TIMES DAILY PRN
Status: DISCONTINUED | OUTPATIENT
Start: 2018-02-13 | End: 2018-02-15 | Stop reason: HOSPADM

## 2018-02-13 RX ORDER — ALBUTEROL SULFATE 90 UG/1
2 AEROSOL, METERED RESPIRATORY (INHALATION) EVERY 6 HOURS
Status: DISCONTINUED | OUTPATIENT
Start: 2018-02-13 | End: 2018-02-15 | Stop reason: HOSPADM

## 2018-02-13 RX ORDER — KETOROLAC TROMETHAMINE 30 MG/ML
30 INJECTION, SOLUTION INTRAMUSCULAR; INTRAVENOUS EVERY 6 HOURS PRN
Status: DISCONTINUED | OUTPATIENT
Start: 2018-02-13 | End: 2018-02-13 | Stop reason: HOSPADM

## 2018-02-13 RX ORDER — ONDANSETRON 2 MG/ML
4 INJECTION INTRAMUSCULAR; INTRAVENOUS EVERY 30 MIN PRN
Status: DISCONTINUED | OUTPATIENT
Start: 2018-02-13 | End: 2018-02-13 | Stop reason: HOSPADM

## 2018-02-13 RX ORDER — FENTANYL CITRATE 50 UG/ML
25-50 INJECTION, SOLUTION INTRAMUSCULAR; INTRAVENOUS
Status: DISCONTINUED | OUTPATIENT
Start: 2018-02-13 | End: 2018-02-13 | Stop reason: HOSPADM

## 2018-02-13 RX ORDER — LIDOCAINE 40 MG/G
CREAM TOPICAL
Status: DISCONTINUED | OUTPATIENT
Start: 2018-02-13 | End: 2018-02-15 | Stop reason: HOSPADM

## 2018-02-13 RX ORDER — NALOXONE HYDROCHLORIDE 0.4 MG/ML
.1-.4 INJECTION, SOLUTION INTRAMUSCULAR; INTRAVENOUS; SUBCUTANEOUS
Status: DISCONTINUED | OUTPATIENT
Start: 2018-02-13 | End: 2018-02-13 | Stop reason: HOSPADM

## 2018-02-13 RX ORDER — OXYCODONE HYDROCHLORIDE 5 MG/1
5-10 TABLET ORAL
Status: DISCONTINUED | OUTPATIENT
Start: 2018-02-13 | End: 2018-02-15 | Stop reason: HOSPADM

## 2018-02-13 RX ORDER — HYDROXYZINE HYDROCHLORIDE 25 MG/1
25 TABLET, FILM COATED ORAL EVERY 6 HOURS PRN
Status: DISCONTINUED | OUTPATIENT
Start: 2018-02-13 | End: 2018-02-15 | Stop reason: HOSPADM

## 2018-02-13 RX ORDER — SODIUM CHLORIDE 9 MG/ML
INJECTION, SOLUTION INTRAVENOUS CONTINUOUS
Status: DISCONTINUED | OUTPATIENT
Start: 2018-02-13 | End: 2018-02-15 | Stop reason: HOSPADM

## 2018-02-13 RX ORDER — CEFAZOLIN SODIUM 1 G/3ML
1 INJECTION, POWDER, FOR SOLUTION INTRAMUSCULAR; INTRAVENOUS SEE ADMIN INSTRUCTIONS
Status: DISCONTINUED | OUTPATIENT
Start: 2018-02-13 | End: 2018-02-13 | Stop reason: HOSPADM

## 2018-02-13 RX ORDER — ONDANSETRON 4 MG/1
4 TABLET, ORALLY DISINTEGRATING ORAL EVERY 30 MIN PRN
Status: DISCONTINUED | OUTPATIENT
Start: 2018-02-13 | End: 2018-02-13 | Stop reason: HOSPADM

## 2018-02-13 RX ORDER — MAGNESIUM HYDROXIDE 1200 MG/15ML
LIQUID ORAL PRN
Status: DISCONTINUED | OUTPATIENT
Start: 2018-02-13 | End: 2018-02-13 | Stop reason: HOSPADM

## 2018-02-13 RX ORDER — FLUTICASONE PROPIONATE 220 UG/1
2 AEROSOL, METERED RESPIRATORY (INHALATION) 2 TIMES DAILY
Status: DISCONTINUED | OUTPATIENT
Start: 2018-02-13 | End: 2018-02-15 | Stop reason: HOSPADM

## 2018-02-13 RX ORDER — ACETAMINOPHEN 325 MG/1
650 TABLET ORAL EVERY 4 HOURS PRN
Status: DISCONTINUED | OUTPATIENT
Start: 2018-02-16 | End: 2018-02-15 | Stop reason: HOSPADM

## 2018-02-13 RX ORDER — ONDANSETRON 2 MG/ML
INJECTION INTRAMUSCULAR; INTRAVENOUS PRN
Status: DISCONTINUED | OUTPATIENT
Start: 2018-02-13 | End: 2018-02-13

## 2018-02-13 RX ORDER — KETAMINE HYDROCHLORIDE 10 MG/ML
INJECTION, SOLUTION INTRAMUSCULAR; INTRAVENOUS PRN
Status: DISCONTINUED | OUTPATIENT
Start: 2018-02-13 | End: 2018-02-13

## 2018-02-13 RX ORDER — FLUMAZENIL 0.1 MG/ML
0.2 INJECTION, SOLUTION INTRAVENOUS
Status: DISCONTINUED | OUTPATIENT
Start: 2018-02-13 | End: 2018-02-13 | Stop reason: HOSPADM

## 2018-02-13 RX ORDER — DEXAMETHASONE SODIUM PHOSPHATE 4 MG/ML
INJECTION, SOLUTION INTRA-ARTICULAR; INTRALESIONAL; INTRAMUSCULAR; INTRAVENOUS; SOFT TISSUE PRN
Status: DISCONTINUED | OUTPATIENT
Start: 2018-02-13 | End: 2018-02-13

## 2018-02-13 RX ORDER — BUPIVACAINE HYDROCHLORIDE AND EPINEPHRINE 2.5; 5 MG/ML; UG/ML
INJECTION, SOLUTION INFILTRATION; PERINEURAL PRN
Status: DISCONTINUED | OUTPATIENT
Start: 2018-02-13 | End: 2018-02-13 | Stop reason: HOSPADM

## 2018-02-13 RX ORDER — ONDANSETRON 4 MG/1
4 TABLET, ORALLY DISINTEGRATING ORAL EVERY 6 HOURS PRN
Status: DISCONTINUED | OUTPATIENT
Start: 2018-02-13 | End: 2018-02-15 | Stop reason: HOSPADM

## 2018-02-13 RX ORDER — FENTANYL CITRATE 50 UG/ML
INJECTION, SOLUTION INTRAMUSCULAR; INTRAVENOUS PRN
Status: DISCONTINUED | OUTPATIENT
Start: 2018-02-13 | End: 2018-02-13

## 2018-02-13 RX ORDER — CEFAZOLIN SODIUM 2 G/100ML
2 INJECTION, SOLUTION INTRAVENOUS
Status: COMPLETED | OUTPATIENT
Start: 2018-02-13 | End: 2018-02-13

## 2018-02-13 RX ORDER — PROCHLORPERAZINE MALEATE 5 MG
10 TABLET ORAL EVERY 6 HOURS PRN
Status: DISCONTINUED | OUTPATIENT
Start: 2018-02-13 | End: 2018-02-15 | Stop reason: HOSPADM

## 2018-02-13 RX ORDER — ASPIRIN 81 MG/1
162 TABLET ORAL DAILY
Status: DISCONTINUED | OUTPATIENT
Start: 2018-02-13 | End: 2018-02-15 | Stop reason: HOSPADM

## 2018-02-13 RX ORDER — FLUTICASONE PROPIONATE 220 UG/1
2 AEROSOL, METERED RESPIRATORY (INHALATION) 2 TIMES DAILY
COMMUNITY

## 2018-02-13 RX ORDER — LIDOCAINE HYDROCHLORIDE 20 MG/ML
INJECTION, SOLUTION INFILTRATION; PERINEURAL PRN
Status: DISCONTINUED | OUTPATIENT
Start: 2018-02-13 | End: 2018-02-13

## 2018-02-13 RX ORDER — PROPOFOL 10 MG/ML
INJECTION, EMULSION INTRAVENOUS PRN
Status: DISCONTINUED | OUTPATIENT
Start: 2018-02-13 | End: 2018-02-13

## 2018-02-13 RX ORDER — SODIUM CHLORIDE, SODIUM LACTATE, POTASSIUM CHLORIDE, CALCIUM CHLORIDE 600; 310; 30; 20 MG/100ML; MG/100ML; MG/100ML; MG/100ML
INJECTION, SOLUTION INTRAVENOUS CONTINUOUS
Status: DISCONTINUED | OUTPATIENT
Start: 2018-02-13 | End: 2018-02-13 | Stop reason: HOSPADM

## 2018-02-13 RX ORDER — MEPERIDINE HYDROCHLORIDE 25 MG/ML
12.5 INJECTION INTRAMUSCULAR; INTRAVENOUS; SUBCUTANEOUS
Status: DISCONTINUED | OUTPATIENT
Start: 2018-02-13 | End: 2018-02-13 | Stop reason: HOSPADM

## 2018-02-13 RX ORDER — ACETAMINOPHEN 325 MG/1
975 TABLET ORAL EVERY 8 HOURS
Status: DISCONTINUED | OUTPATIENT
Start: 2018-02-13 | End: 2018-02-15 | Stop reason: HOSPADM

## 2018-02-13 RX ORDER — NALOXONE HYDROCHLORIDE 0.4 MG/ML
.1-.4 INJECTION, SOLUTION INTRAMUSCULAR; INTRAVENOUS; SUBCUTANEOUS
Status: DISCONTINUED | OUTPATIENT
Start: 2018-02-13 | End: 2018-02-15 | Stop reason: HOSPADM

## 2018-02-13 RX ORDER — METOCLOPRAMIDE 10 MG/1
10 TABLET ORAL EVERY 6 HOURS PRN
Status: DISCONTINUED | OUTPATIENT
Start: 2018-02-13 | End: 2018-02-15 | Stop reason: HOSPADM

## 2018-02-13 RX ORDER — PROPOFOL 10 MG/ML
INJECTION, EMULSION INTRAVENOUS CONTINUOUS PRN
Status: DISCONTINUED | OUTPATIENT
Start: 2018-02-13 | End: 2018-02-13

## 2018-02-13 RX ORDER — HYDROMORPHONE HYDROCHLORIDE 1 MG/ML
.2-.6 INJECTION, SOLUTION INTRAMUSCULAR; INTRAVENOUS; SUBCUTANEOUS
Status: DISCONTINUED | OUTPATIENT
Start: 2018-02-13 | End: 2018-02-15 | Stop reason: HOSPADM

## 2018-02-13 RX ORDER — LIDOCAINE 40 MG/G
CREAM TOPICAL
Status: DISCONTINUED | OUTPATIENT
Start: 2018-02-13 | End: 2018-02-13 | Stop reason: HOSPADM

## 2018-02-13 RX ORDER — OXYCODONE HYDROCHLORIDE 5 MG/1
5 TABLET ORAL EVERY 4 HOURS PRN
Status: DISCONTINUED | OUTPATIENT
Start: 2018-02-13 | End: 2018-02-13

## 2018-02-13 RX ORDER — ONDANSETRON 2 MG/ML
4 INJECTION INTRAMUSCULAR; INTRAVENOUS EVERY 6 HOURS PRN
Status: DISCONTINUED | OUTPATIENT
Start: 2018-02-13 | End: 2018-02-15 | Stop reason: HOSPADM

## 2018-02-13 RX ORDER — CEFAZOLIN SODIUM 2 G/100ML
2 INJECTION, SOLUTION INTRAVENOUS EVERY 8 HOURS
Status: COMPLETED | OUTPATIENT
Start: 2018-02-13 | End: 2018-02-14

## 2018-02-13 RX ORDER — CITALOPRAM HYDROBROMIDE 10 MG/1
10 TABLET ORAL DAILY
Status: DISCONTINUED | OUTPATIENT
Start: 2018-02-14 | End: 2018-02-15 | Stop reason: HOSPADM

## 2018-02-13 RX ADMIN — CEFAZOLIN SODIUM 2 G: 2 INJECTION, SOLUTION INTRAVENOUS at 23:46

## 2018-02-13 RX ADMIN — FENTANYL CITRATE 50 MCG: 50 INJECTION, SOLUTION INTRAMUSCULAR; INTRAVENOUS at 11:12

## 2018-02-13 RX ADMIN — ONDANSETRON 4 MG: 2 INJECTION INTRAMUSCULAR; INTRAVENOUS at 08:09

## 2018-02-13 RX ADMIN — OXYCODONE HYDROCHLORIDE 5 MG: 5 TABLET ORAL at 23:46

## 2018-02-13 RX ADMIN — FENTANYL CITRATE 50 MCG: 50 INJECTION, SOLUTION INTRAMUSCULAR; INTRAVENOUS at 08:52

## 2018-02-13 RX ADMIN — OXYCODONE HYDROCHLORIDE 5 MG: 5 TABLET ORAL at 16:21

## 2018-02-13 RX ADMIN — FLUTICASONE PROPIONATE 2 PUFF: 220 AEROSOL, METERED RESPIRATORY (INHALATION) at 21:01

## 2018-02-13 RX ADMIN — FENTANYL CITRATE 100 MCG: 50 INJECTION, SOLUTION INTRAMUSCULAR; INTRAVENOUS at 08:43

## 2018-02-13 RX ADMIN — OXYCODONE HYDROCHLORIDE 5 MG: 5 TABLET ORAL at 19:24

## 2018-02-13 RX ADMIN — SODIUM CHLORIDE, POTASSIUM CHLORIDE, SODIUM LACTATE AND CALCIUM CHLORIDE: 600; 310; 30; 20 INJECTION, SOLUTION INTRAVENOUS at 08:03

## 2018-02-13 RX ADMIN — OXYCODONE HYDROCHLORIDE 5 MG: 5 TABLET ORAL at 20:54

## 2018-02-13 RX ADMIN — KETAMINE HCL-NACL SOLN PREF SY 50 MG/5ML-0.9% (10MG/ML) 20 MG: 10 SOLUTION PREFILLED SYRINGE at 09:27

## 2018-02-13 RX ADMIN — CEFAZOLIN SODIUM 2 G: 2 INJECTION, SOLUTION INTRAVENOUS at 16:16

## 2018-02-13 RX ADMIN — PROPOFOL 250 MG: 10 INJECTION, EMULSION INTRAVENOUS at 08:09

## 2018-02-13 RX ADMIN — FENTANYL CITRATE 100 MCG: 50 INJECTION, SOLUTION INTRAMUSCULAR; INTRAVENOUS at 11:05

## 2018-02-13 RX ADMIN — OXYCODONE HYDROCHLORIDE 5 MG: 5 TABLET ORAL at 13:32

## 2018-02-13 RX ADMIN — BUPIVACAINE HYDROCHLORIDE AND EPINEPHRINE BITARTRATE 10 ML: 2.5; .005 INJECTION, SOLUTION INFILTRATION; PERINEURAL at 07:55

## 2018-02-13 RX ADMIN — FENTANYL CITRATE 50 MCG: 50 INJECTION, SOLUTION INTRAMUSCULAR; INTRAVENOUS at 09:02

## 2018-02-13 RX ADMIN — MIDAZOLAM HYDROCHLORIDE 2 MG: 1 INJECTION, SOLUTION INTRAMUSCULAR; INTRAVENOUS at 07:46

## 2018-02-13 RX ADMIN — FENTANYL CITRATE 25 MCG: 50 INJECTION, SOLUTION INTRAMUSCULAR; INTRAVENOUS at 07:46

## 2018-02-13 RX ADMIN — PROPOFOL 125 MCG/KG/MIN: 10 INJECTION, EMULSION INTRAVENOUS at 10:09

## 2018-02-13 RX ADMIN — PROPOFOL 50 MG: 10 INJECTION, EMULSION INTRAVENOUS at 08:43

## 2018-02-13 RX ADMIN — PHENYLEPHRINE HYDROCHLORIDE 100 MCG: 10 INJECTION, SOLUTION INTRAMUSCULAR; INTRAVENOUS; SUBCUTANEOUS at 08:51

## 2018-02-13 RX ADMIN — PROPOFOL 50 MG: 10 INJECTION, EMULSION INTRAVENOUS at 10:50

## 2018-02-13 RX ADMIN — HYDROXYZINE HYDROCHLORIDE 25 MG: 25 TABLET ORAL at 11:41

## 2018-02-13 RX ADMIN — SODIUM CHLORIDE 1 G: 9 INJECTION, SOLUTION INTRAVENOUS at 08:26

## 2018-02-13 RX ADMIN — PROPOFOL 50 MG: 10 INJECTION, EMULSION INTRAVENOUS at 10:41

## 2018-02-13 RX ADMIN — PHENYLEPHRINE HYDROCHLORIDE 100 MCG: 10 INJECTION, SOLUTION INTRAMUSCULAR; INTRAVENOUS; SUBCUTANEOUS at 09:57

## 2018-02-13 RX ADMIN — ASPIRIN 162 MG: 81 TABLET, COATED ORAL at 13:32

## 2018-02-13 RX ADMIN — ACETAMINOPHEN 975 MG: 325 TABLET, FILM COATED ORAL at 23:58

## 2018-02-13 RX ADMIN — PHENYLEPHRINE HYDROCHLORIDE 100 MCG: 10 INJECTION, SOLUTION INTRAMUSCULAR; INTRAVENOUS; SUBCUTANEOUS at 10:23

## 2018-02-13 RX ADMIN — LIDOCAINE HYDROCHLORIDE 100 MG: 20 INJECTION, SOLUTION INFILTRATION; PERINEURAL at 08:09

## 2018-02-13 RX ADMIN — SODIUM CHLORIDE, POTASSIUM CHLORIDE, SODIUM LACTATE AND CALCIUM CHLORIDE: 600; 310; 30; 20 INJECTION, SOLUTION INTRAVENOUS at 09:51

## 2018-02-13 RX ADMIN — ACETAMINOPHEN 975 MG: 325 TABLET, FILM COATED ORAL at 13:32

## 2018-02-13 RX ADMIN — OXYCODONE HYDROCHLORIDE 5 MG: 5 TABLET ORAL at 16:16

## 2018-02-13 RX ADMIN — CEFAZOLIN SODIUM 2 G: 2 INJECTION, SOLUTION INTRAVENOUS at 08:23

## 2018-02-13 RX ADMIN — FENTANYL CITRATE 50 MCG: 50 INJECTION, SOLUTION INTRAMUSCULAR; INTRAVENOUS at 11:20

## 2018-02-13 RX ADMIN — SODIUM CHLORIDE 1 G: 9 INJECTION, SOLUTION INTRAVENOUS at 10:36

## 2018-02-13 RX ADMIN — PROPOFOL 200 MCG/KG/MIN: 10 INJECTION, EMULSION INTRAVENOUS at 08:15

## 2018-02-13 RX ADMIN — BUPIVACAINE 10 ML: 13.3 INJECTION, SUSPENSION, LIPOSOMAL INFILTRATION at 07:55

## 2018-02-13 RX ADMIN — FLUTICASONE FUROATE 1 PUFF: 200 POWDER RESPIRATORY (INHALATION) at 16:25

## 2018-02-13 RX ADMIN — MIDAZOLAM 2 MG: 1 INJECTION INTRAMUSCULAR; INTRAVENOUS at 09:05

## 2018-02-13 RX ADMIN — FENTANYL CITRATE 25 MCG: 50 INJECTION, SOLUTION INTRAMUSCULAR; INTRAVENOUS at 07:49

## 2018-02-13 RX ADMIN — DEXAMETHASONE SODIUM PHOSPHATE 8 MG: 4 INJECTION, SOLUTION INTRAMUSCULAR; INTRAVENOUS at 08:09

## 2018-02-13 ASSESSMENT — LIFESTYLE VARIABLES: TOBACCO_USE: 1

## 2018-02-13 NOTE — IP AVS SNAPSHOT
[facility]    [address]    [place] [address]    Phone:  [phone]                                       After Visit Summary   2/13/2018    Janice Wade    MRN: [id number]           After Visit Summary Signature Page     I have received my discharge instructions, and my questions have been answered. I have discussed any challenges I see with this plan with the nurse or doctor.    ..........................................................................................................................................  Patient/Patient Representative Signature      ..........................................................................................................................................  Patient Representative Print Name and Relationship to Patient    ..................................................               ................................................  Date                                            Time    ..........................................................................................................................................  Reviewed by Signature/Title    ...................................................              ..............................................  Date                                                            Time

## 2018-02-13 NOTE — ANESTHESIA CARE TRANSFER NOTE
Patient: Janice Wade    Procedure(s):  Examination Under Anesthesia Right Knee, Right Knee Arthroscopy, Distal Femoral Osteotomy   - Wound Class: I-Clean   - Wound Class: I-Clean    Diagnosis: Genu Valgum  Diagnosis Additional Information: No value filed.    Anesthesia Type:   General, Periph. Nerve Block for postop pain     Note:  Airway :Face Mask  Patient transferred to:PACU  Comments: VSS.  Spontaneous respirations.  Moving all extremities.  Drowsy but arousable.  Satisfactory anesthetic recovery.Handoff Report: Identifed the Patient, Identified the Reponsible Provider, Reviewed the pertinent medical history, Discussed the surgical course, Reviewed Intra-OP anesthesia mangement and issues during anesthesia, Set expectations for post-procedure period and Allowed opportunity for questions and acknowledgement of understanding      Vitals: (Last set prior to Anesthesia Care Transfer)    CRNA VITALS  2/13/2018 1035 - 2/13/2018 1118      2/13/2018             NIBP: 110/79    Pulse: 72    NIBP Mean: 87    Temp: 36.6  C (97.9  F)    SpO2: 94 %    Resp Rate (observed): 27    EKG: NSR                Electronically Signed By: RENNY Blanc CRNA  February 13, 2018  11:18 AM

## 2018-02-13 NOTE — IP AVS SNAPSHOT
MRN:[id number]                      After Visit Summary   2/13/2018    Janice Wade    MRN: [id number]           Thank you!     Thank you for choosing [facility] for your care. Our goal is always to provide you with excellent care. Hearing back from our patients is one way we can continue to improve our services. Please take a few minutes to complete the written survey that you may receive in the mail after you visit with us. Thank you!        Patient Information     Date Of Birth          1990        Designated Caregiver       Most Recent Value    Caregiver    Will someone help with your care after discharge? yes    Name of designated caregiver if needed, grandma will care for victoria Ivy     Phone number of caregiver [phone]    Caregiver address [place], [place]       About your hospital stay     You were admitted on:  February 13, 2018 You last received care in the:   8A    You were discharged on:  February 15, 2018        Reason for your hospital stay       You were admitted for observation, pain control, and therapies following your distal femoral osteotomy.                  Who to Call     For medical emergencies, please call 911.  For non-urgent questions about your medical care, please call your primary care provider or clinic, [phone]  For questions related to your surgery, please call your surgery clinic        Attending Provider     Provider Jorge Gar MD Orthopedics       Primary Care Provider Office Phone # Fax #    Gustavo Harrison PA-C [phone]      After Care Instructions     Activity       Your activity upon discharge:  -- OK to be up and ambulating with hinged knee brace locked in extension  -- OK for knee ROM 0-90 when in therapy or sitting and resting            Diet       Follow this diet upon discharge: Orders Placed This Encounter      Regular Diet Adult            Wound care and dressings       Instructions  "to care for your wound at home:  -- OK to remove dressing at POD#3   -- OK to shower on the day the dressing is removed   -- Replace dressing with a clean dry bandage after bathing  -- Keep wound covered through 1st follow up clinic appointment.                  Follow-up Appointments     Adult [facility] Follow-up and recommended labs and tests       You are scheduled to follow up with Dr. Estrada 02/18/2018    Appointments on [place] and/or [facility] (with [facility] or [facility] provider or service). Call [phone] if you haven't heard regarding these appointments within 7 days of discharge.                  Your next 10 appointments already scheduled     Feb 19, 2018 12:30 PM CST   (Arrive by 12:15 PM)   RETURN KNEE with Jorge Estrada MD   [facility] ([facility])    [address] [place] [id number] [phone]              Pending Results     No orders found from 2/11/2018 to 2/14/2018.            Statement of Approval     Ordered          02/15/18 0840  I have reviewed and agree with all the recommendations and orders detailed in this document.  EFFECTIVE NOW     Approved and electronically signed by:  Jorge Estrada MD             Admission Information     Date & Time Provider Department Dept. Phone    2/13/2018 Jorge Estrada MD  8A [phone]      Your Vitals Were     Blood Pressure Pulse Temperature Respirations Height Weight    117/71 86 99  F (37.2  C) (Oral) 16 1.607 m (5' 3.25\") 90.1 kg (198 lb 10.2 oz)    Last Period Pulse Oximetry BMI (Body Mass Index)             02/19/2016 (Exact Date) 93% 34.91 kg/m2         MyChart Information     [employer] gives you secure access to your electronic health record. If you see a primary care provider, you can also send messages to your care team and make appointments. If you have questions, please call your primary care clinic.  If you do not have a " primary care provider, please call [phone] and they will assist you.        Care EveryWhere ID     This is your Care EveryWhere ID. This could be used by other organizations to access your [facility] medical records  [id number]        Equal Access to Services     DORA BONILLA : Hadii aad ku hadcorneliakrissy Soyvrose, wavonda luqadaha, qaalsesandrata kaalmada tramaine, betito danya anatalicia caicedo bryantjose del valle. So [facility] [phone].    ATENCIÓN: Si habla español, tiene a hannah disposición servicios gratuitos de asistencia lingüística. Llame al [phone].    We comply with applicable federal civil rights laws and [place] laws. We do not discriminate on the basis of race, color, national origin, age, disability, sex, sexual orientation, or gender identity.               Review of your medicines      START taking        Dose / Directions    aspirin 81 MG EC tablet        Dose:  162 mg   Take 2 tablets (162 mg) by mouth daily for 28 days   Quantity:  56 tablet   Refills:  0       oxyCODONE IR 5 MG tablet   Commonly known as:  ROXICODONE        Dose:  5-10 mg   Take 1-2 tablets (5-10 mg) by mouth every 4 hours as needed for other (pain control or improvement in physical function. Hold dose for analgesic side effects.)   Quantity:  60 tablet   Refills:  0       senna-docusate 8.6-50 MG per tablet   Commonly known as:  SENOKOT-S;PERICOLACE        Dose:  1-2 tablet   Take 1-2 tablets by mouth 2 times daily   Quantity:  60 tablet   Refills:  0         CONTINUE these medicines which have NOT CHANGED        Dose / Directions    acetaminophen 325 MG tablet   Commonly known as:  TYLENOL   Used for:  Closed fracture of right tibial plateau with routine healing, subsequent encounter        Dose:  650 mg   Take 2 tablets (650 mg) by mouth every 4 hours as needed for other (mild pain)   Quantity:  100 tablet   Refills:  0       albuterol 108 (90 BASE) MCG/ACT Inhaler   Commonly known as:  PROAIR HFA/PROVENTIL HFA/VENTOLIN HFA        Dose:  2  puff   Inhale 2 puffs into the lungs every 6 hours   Refills:  0       ALPRAZOLAM PO        Dose:  0.25 mg   Take 0.25 mg by mouth as needed for anxiety   Refills:  0       blood glucose monitoring lancets   Used for:  Hyperglycemia        Use to test blood sugar 4 times daily or as directed.   Quantity:  1 Box   Refills:  5       blood glucose monitoring meter device kit   Commonly known as:  no brand specified   Used for:  Hyperglycemia        Use to test blood sugar 4 times daily or as directed.   Quantity:  1 kit   Refills:  0       BLOOD GLUCOSE TEST STRIPS Strp   Used for:  Hyperglycemia        4 times a day check   Quantity:  100 each   Refills:  5       CITALOPRAM HYDROBROMIDE PO        Dose:  10 mg   Take 10 mg by mouth daily   Refills:  0       fluticasone 220 MCG/ACT Inhaler   Commonly known as:  FLOVENT HFA        Dose:  2 puff   Inhale 2 puffs into the lungs 2 times daily   Refills:  0       hydrOXYzine 25 MG tablet   Commonly known as:  ATARAX   Used for:  Closed fracture of right tibial plateau with routine healing, subsequent encounter        Dose:  25 mg   Take 1 tablet (25 mg) by mouth every 6 hours as needed for itching (and nausea)   Quantity:  30 tablet   Refills:  0            Where to get your medicines      These medications were sent to [facility] [address]  [address] [address]     Phone:  [phone]     aspirin 81 MG EC tablet    senna-docusate 8.6-50 MG per tablet         Some of these will need a paper prescription and others can be bought over the counter. Ask your nurse if you have questions.     Bring a paper prescription for each of these medications     oxyCODONE IR 5 MG tablet                Protect others around you: Learn how to safely use, store and throw away your medicines at www.disposemymeds.org.        Information about OPIOIDS     PRESCRIPTION OPIOIDS: WHAT YOU NEED TO KNOW    Prescription opioids can be  used to help relieve moderate to severe pain and are often prescribed following a surgery or injury, or for certain health conditions. These medications can be an important part of treatment but also come with serious risks. It is important to work with your health care provider to make sure you are getting the safest, most effective care.    WHAT ARE THE RISKS AND SIDE EFFECTS OF OPIOID USE?  Prescription opioids carry serious risks of addiction and overdose, especially with prolonged use. An opioid overdose, often marked by slowed breathing can cause sudden death. The use of prescription opioids can have a number of side effects as well, even when taken as directed:      Tolerance - meaning you might need to take more of a medication for the same pain relief    Physical dependence - meaning you have symptoms of withdrawal when a medication is stopped    Increased sensitivity to pain    Constipation    Nausea, vomiting, and dry mouth    Sleepiness and dizziness    Confusion    Depression    Low levels of testosterone that can result in lower sex drive, energy, and strength    Itching and sweating    RISKS ARE GREATER WITH:    History of drug misuse, substance use disorder, or overdose    Mental health conditions (such as depression or anxiety)    Sleep apnea    Older age (65 years or older)    Pregnancy    Avoid alcohol while taking prescription opioids.   Also, unless specifically advised by your health care provider, medications to avoid include:    Benzodiazepines (such as Xanax or Valium)    Muscle relaxants (such as Soma or Flexeril)    Hypnotics (such as Ambien or Lunesta)    Other prescription opioids    KNOW YOUR OPTIONS:  Talk to your health care provider about ways to manage your pain that do not involve prescription opioids. Some of these options may actually work better and have fewer risks and side effects:    Pain relievers such as acetaminophen, ibuprofen, and naproxen    Some medications that are  also used for depression or seizures    Physical therapy and exercise    Cognitive behavioral therapy, a psychological, goal-directed approach, in which patients learn how to modify physical, behavioral, and emotional triggers of pain and stress    IF YOU ARE PRESCRIBED OPIOIDS FOR PAIN:    Never take opioids in greater amounts or more often than prescribed    Follow up with your primary health care provider and work together to create a plan on how to manage your pain.    Talk about ways to help manage your pain that do not involve prescription opioids    Talk about all concerns and side effects    Help prevent misuse and abuse    Never sell or share prescription opioids    Never use another person's prescription opioids    Store prescription opioids in a secure place and out of reach of others (this may include visitors, children, friends, and family)    Visit www.cdc.gov/drugoverdose to learn about risks of opioid abuse and overdose    If you believe you may be struggling with addiction, tell your health care provider and ask for guidance or call [facility]'s National Helpline at 3-030-420-HELP    LEARN MORE / www.cdc.gov/drugoverdose/prescribing/guideline.html    Safely dispose of unused prescription opioids: Find your local drug take-back programs and more information about the importance of safe disposal at www.doseofreality.mn.gov             Medication List: This is a list of all your medications and when to take them. Check marks below indicate your daily home schedule. Keep this list as a reference.      Medications           Morning Afternoon Evening Bedtime As Needed    acetaminophen 325 MG tablet   Commonly known as:  TYLENOL   Take 2 tablets (650 mg) by mouth every 4 hours as needed for other (mild pain)   Last time this was given:  975 mg on 2/15/2018  3:51 AM                                albuterol 108 (90 BASE) MCG/ACT Inhaler   Commonly known as:  PROAIR HFA/PROVENTIL HFA/VENTOLIN HFA   Inhale 2 puffs  into the lungs every 6 hours   Last time this was given:  2 puffs on 2/14/2018  1:21 PM                                ALPRAZOLAM PO   Take 0.25 mg by mouth as needed for anxiety                                aspirin 81 MG EC tablet   Take 2 tablets (162 mg) by mouth daily for 28 days   Last time this was given:  162 mg on 2/15/2018  8:44 AM                                blood glucose monitoring lancets   Use to test blood sugar 4 times daily or as directed.                                blood glucose monitoring meter device kit   Commonly known as:  no brand specified   Use to test blood sugar 4 times daily or as directed.                                BLOOD GLUCOSE TEST STRIPS Strp   4 times a day check                                CITALOPRAM HYDROBROMIDE PO   Take 10 mg by mouth daily   Last time this was given:  10 mg on 2/15/2018  8:44 AM                                fluticasone 220 MCG/ACT Inhaler   Commonly known as:  FLOVENT HFA   Inhale 2 puffs into the lungs 2 times daily   Last time this was given:  2 puffs on 2/15/2018  8:44 AM                                hydrOXYzine 25 MG tablet   Commonly known as:  ATARAX   Take 1 tablet (25 mg) by mouth every 6 hours as needed for itching (and nausea)   Last time this was given:  25 mg on 2/14/2018  7:45 PM                                oxyCODONE IR 5 MG tablet   Commonly known as:  ROXICODONE   Take 1-2 tablets (5-10 mg) by mouth every 4 hours as needed for other (pain control or improvement in physical function. Hold dose for analgesic side effects.)   Last time this was given:  5 mg on 2/15/2018  8:43 AM                                senna-docusate 8.6-50 MG per tablet   Commonly known as:  SENOKOT-S;PERICOLACE   Take 1-2 tablets by mouth 2 times daily   Last time this was given:  2 tablets on 2/15/2018  8:44 AM

## 2018-02-13 NOTE — BRIEF OP NOTE
Brief Ortho Op Note    Preop Diagnosis:  1. Right knee lateral compartment osteoarthritis    Post Op Diagnosis:  1. Right knee lateral compartment osteoarthritis    Procedure:  1. Right knee examination under anesthesia   2. Right knee diagnostic arthroscopy  3. Right lateral distal femoral opening wedge osteotomy    Surgeon:  Dr. Uziel Estrada MD    Assistant:  1. Hector Garay, PGY-5  2. Gustavo Crisostomo MD    Anesthesia: GET    EBL: 100 mL    Implants: Arthrex contour lock distal femoral plate     Tourniquet Time: none applied    Drains: none    Complications: none    Specimens: none     Findings: Right knee lateral compartment osteoarthrosis.     Post-Op Plan:  Assessment/Plan: Janice Wade is a 27 year old female s/p right knee arthroscopy and lateral distal femoral opening wedge osteotomy on 02/13/18 with Dr. Estrada.    Activity: Up with assist.  Weight bearing status: TTWB RLE  Antibiotics: Ancef x 24 hours.  Diet: Begin with clear fluids and progress diet as tolerated.  DVT prophylaxis:  mg QD and mechanical while in the hospital, discharge on  mg QD x 4 weeks.  Bracing/Splinting: HKB to be on full time. Brace to be locked in full extension when up and ambulating. OK to be unlocked when sitting or lying. No range of motion restrictions  Elevation: Elevate RLE on pillows to keep above the level of the heart as much as possible.  Wound Care: Dressing change at bedside by Ortho on POD #3.  Pain management: transition from IV to orals as tolerated.  X-rays: No post-operative XR needed  Physical Therapy: Gait training, ROM, ADL's.  Occupational Therapy: ADLs  Labs: No post-op Hgb. Will plan to follow clinically    Follow-up: Clinic with Dr. Estrada in 1 week with non-weight bearing AP/Lateral of the right knee    Disposition: Pending progress with therapies, pain control on orals, and medical stability, anticipate discharge to home on POD #1-2.    Hector Garay MD 02/13/18  Orthopaedic Surgery  Resident, PGY-5  Pager: [phone]

## 2018-02-13 NOTE — ANESTHESIA POSTPROCEDURE EVALUATION
Patient: Janice Wade    Procedure(s):  Examination Under Anesthesia Right Knee, Right Knee Arthroscopy, Distal Femoral Osteotomy   - Wound Class: I-Clean   - Wound Class: I-Clean    Diagnosis:Genu Valgum  Diagnosis Additional Information: No value filed.    Anesthesia Type:  General, Periph. Nerve Block for postop pain    Note:  Anesthesia Post Evaluation    Patient location during evaluation: bedside  Patient participation: Able to participate in evaluation but full recovery from regional anesthesia has not yet occurred and is not anticipated to occur within 48 hours  Level of consciousness: awake  Pain management: adequate  Airway patency: patent  Cardiovascular status: acceptable  Respiratory status: acceptable  Hydration status: acceptable  PONV: controlled     Anesthetic complications: None          Last vitals:  Vitals:    02/13/18 1130 02/13/18 1145 02/13/18 1200   BP: 95/69 117/78 111/69   Resp: 11 14 15   Temp: 36.7  C (98.1  F)  36.8  C (98.2  F)   SpO2: 96% 95% 95%         Electronically Signed By: Melquiades Ortiz MD, MD  February 13, 2018  12:27 PM

## 2018-02-13 NOTE — OR NURSING
Pt very agitated, asking for many things all at once then falling back to sleep. Meds given per order.

## 2018-02-13 NOTE — PLAN OF CARE
Problem: Knee Arthroplasty (Total, Partial) (Adult)  Goal: Signs and Symptoms of Listed Potential Problems Will be Absent, Minimized or Managed (Knee Arthroplasty)  Signs and symptoms of listed potential problems will be absent, minimized or managed by discharge/transition of care (reference Knee Arthroplasty (Total, Partial) (Adult) CPG).   Outcome: Improving  Pt arrived to unit at 1245 and was oriented to room and call light  VS: VSS, capnography stable; pt then refused capnography monitoring.   O2: >90% on 1.5 L   Output: Voiding adequate amounts. PVR 16.   Last BM: 2/12   Activity: TTWB RLE. HKB locked in extension when up. Unlocked, ROMAT when in bed.   Skin: Intact except for incision   Pain: Managed with 5-10mg oxycodone.   CMS: Intact   Dressing: CDI   Diet: Advanced to regular which pt tolerated well   LDA: PIV SL btw abx   Equipment: HKB, pulse ox, FWW, IV pole   Plan: Likely home upon DC   Additional Info: Pt received general anesthesia + exparel. .  Pt has had many surgeries on R knee.

## 2018-02-13 NOTE — ANESTHESIA PREPROCEDURE EVALUATION
Anesthesia Evaluation     . Pt has had prior anesthetic. Type: General    No history of anesthetic complications          ROS/MED HX    ENT/Pulmonary:     (+)tobacco use, Past use Intermittent asthma , . .    Neurologic:  - neg neurologic ROS     Cardiovascular:  - neg cardiovascular ROS       METS/Exercise Tolerance:  4 - Raking leaves, gardening   Hematologic:  - neg hematologic  ROS       Musculoskeletal:  - neg musculoskeletal ROS       GI/Hepatic:  - neg GI/hepatic ROS       Renal/Genitourinary:  - ROS Renal section negative       Endo:  - neg endo ROS       Psychiatric:  - neg psychiatric ROS       Infectious Disease:  - neg infectious disease ROS       Malignancy:      - no malignancy   Other:                     Physical Exam  Normal systems: dental    Airway   Mallampati: I  TM distance: >3 FB  Neck ROM: full    Dental     Cardiovascular   Rhythm and rate: regular and normal      Pulmonary    breath sounds clear to auscultation                        Anesthesia Plan      History & Physical Review  History and physical reviewed and following examination; no interval change.    ASA Status:  2 .        Plan for General and Periph. Nerve Block for postop pain with Intravenous induction. Maintenance will be TIVA.    PONV prophylaxis:  Ondansetron (or other 5HT-3) and Dexamethasone or Solumedrol       Postoperative Care  Postoperative pain management:  Oral pain medications, Peripheral nerve block (Single Shot) and Multi-modal analgesia.      Consents  Anesthetic plan, risks, benefits and alternatives discussed with:  Patient or representative and Patient..                          .

## 2018-02-13 NOTE — OP NOTE
Procedure Date: 02/13/2018      PREOPERATIVE DIAGNOSES:   1.  Posttraumatic osteoarthritis, lateral compartment, right knee.   2.  Genu valgum malalignment, right knee.   3.  Status post devastating tibial plateau fracture treated with open reduction and internal fixation with subsequent hardware removal.   4.  Status post severe multi-ligament injury treated with ACL reconstruction and posterolateral corner reconstruction with continued stability of the knee following ligament reconstruction.   5.  Retained ACL hardware.      POSTOPERATIVE DIAGNOSES:     1.  Posttraumatic osteoarthritis, lateral compartment, right knee.   2.  Genu valgum malalignment, right knee.   3.  Status post devastating tibial plateau fracture treated with open reduction and internal fixation with subsequent hardware removal.   4.  Status post severe multi-ligament injury treated with ACL reconstruction and posterolateral corner reconstruction with continued stability of the knee following ligament reconstruction.   5.  Retained ACL hardware.      PROCEDURES:   1.  Examination under anesthesia, right knee.   2.  Right knee arthroscopy.   3.  Chondroplasty, lateral femoral condyle and lateral tibial plateau.   4.  Open hardware removal, deep, right lateral femur ACL button.   5.  Distal femoral osteotomy, right leg with a planned 7 degree correction.      SURGEON:  Jorge Estrada MD      ASSISTANT:     1.  Hector Garay MD   2.  Gustavo Crisostomo MD      OPERATIVE INDICATIONS:  Janice Wade is a pleasant 27-year-old female seen through my Orthopedic Clinic.  She is well known to me.  She sustained a devastating tibial plateau fracture, lateral compartment split depression fracture dislocation variant treated with open reduction internal fixation.  She also had ACL posterolateral corner tears at the same time.  She then presented to my clinic after definitive fixation.  I performed ACL and posterolateral corner reconstruction.  She went on to  do relatively well after this though she has had progressive deterioration of her lateral compartment.  I performed a staging knee arthroscopy, debridement and open hardware removal of her proximal tibia at a previous surgery.  She noted continued pain and symptomatology.  In light of this, I ultimately elected to offer her a distal femoral osteotomy when her long leg alignment films demonstrate genu valgum malalignment.  The second portion was to see if we could plan for a biological reconstruction given her young age.  I felt potentially treatment with osteochondral allograft transplantation to her lateral femoral condyle, lateral tibial plateau would be reasonable.  I reviewed with the patient diagnosis and potential treatment options.  She understood that she may need future surgery.  She understood the risks of distal femoral osteotomy.  Together through a combined decision making approach desired to proceed.      OPERATIVE DETAILS:  In the preoperative area, the patient's informed consent was reviewed and she desired to proceed.  The right knee was marked.  The patient was in agreement.  Taken to the operating room where timeout was performed and all parties were in agreement.  Preoperative antibiotics were given within 1 hour of time of surgery.  Placed supine on the operating room table, surrendered to LMA anesthesia and examination under anesthesia was performed with following findings:  Range of motion 0-145.  Stable to valgus stress testing with no opening.  Stable to varus stress testing with no significant opening.  Lachman's showed a good endpoint though subtly increased translation, no definite pivot with just a pivot glide.  No patellar instability.  At this time, a bump was placed underneath the ipsilateral hip.  Egg crate was placed beneath the well leg.  Right leg was prepped and draped in the usual sterile fashion.  A sterile bump was placed to allow the knee to be flexed subtly.  After  sufficient prepping and draping diagnostic arthroscopy was performed with the following findings:  No loose bodies in the suprapatellar pouch, medial gutter and lateral gutter.  ACL graft was intact and showed tension to probing.  PCL was intact.  Medial femoral condyle showed some grade 1 softening, though overall the cartilage surfaces were intact.  Medial tibial plateau was normal.  Medial meniscus was intact.  Lateral femoral condyle showed grade 4 change.  Lateral tibial plateau showed grade 4 change.  No drive-through sign of the lateral compartment.  Patellofemoral compartment showed intact cartilage surfaces, just some grade 1 softening of the patella as well as the trochlea, though overall the cartilage surfaces were maintained and appeared normal.  At this time, a chondroplasty of the lateral compartment was performed until a balanced stable rim of cartilage remained.  At this time, the arthroscope was withdrawn and the portal sites were closed with nylon.  At this time, the patient's old incision was utilized and the skin and subcutaneous tissues was divided and the lateral femur was exposed.  Retractors were placed.  At this time, we placed two guide pins in a superior lateral to inferomedial direction starting on the metaphysis and at the  femoral insertion of the MCL.  These were done to ensure that we stayed above of the trochlea as well as outside of the ACL graft.  We were satisfied with the positioning and then confirmed that the osteotomy was located perpendicular to the femoral access.  An oscillating saw was used to divide the lateral cortex.  This is completed, both anterior and posteriorly with an osteotome.  Gentle  was introduced and some spreading was performed until we reached 7-1/2 degrees on the spreading wedges.  Radiographs were then obtained of the hip and the ankle confirmed that we were satisfied with our reduction of approximately 7-1/2 to potentially an 8 degree  correction.  At this time, we were satisfied with the position and the Arthrex contour lock plate was brought up where it was fixed with 6.5 screws distally and 4.5 large fragment screws proximally through the locking mechanism.  We were satisfied with its position of all the screws as well as the reduction.  The plate was well centered on the bone.  Copious irrigation was performed.  Bone grafting was then performed with a slurry of 10 mL of crushed allograft bone and 15 mL of demineralized bone matrix.  This was packed into the osteotomy site.  Final images were obtained.  A layered closure was initiated with 0 Vicryl, 2-0 Vicryl and Monocryl.  Sterile dressings were applied.  The patient was awakened from anesthesia, transferred to recovery room in stable condition with stable vital signs.      COMPLICATIONS:  None apparent.      DRAINS:  None.      SPECIMENS:  None.      ESTIMATED BLOOD LOSS:  100 mL.      TOURNIQUET TIME:  None.  No tourniquet was placed.      POSTOPERATIVE PLAN:  The patient will be toe touch weightbearing x6 weeks.  Range of motion as tolerated.  Hinged knee brace on and locked when up and around for the first 6 weeks, off or locked for sitting or doing therapy, etc.  No CPM.  Aspirin for DVT prophylaxis starting on postoperative day #1.      We will make a plan going forward depending on how she does, whether she needs cartilage reconstruction of the lateral compartment with osteochondral allograft transplantation of her femur plus osteochondral allograft transplantation of the tibial plateau.  If she has satisfactory improvement with just the osteotomy then continued observation will be observed.         MELVA FOURNIER MD             D: 2018   T: 2018   MT: MAIDA      Name:     GINGER MARIE   MRN:      [id number]        Account:        [id number]   :      1990           Procedure Date: 2018      Document: [id number]

## 2018-02-13 NOTE — OR NURSING
PACU to Inpatient Nursing Handoff    Patient Janice Wade is a 27 year old female who speaks English.   Procedure Procedure(s):  Examination Under Anesthesia Right Knee, Right Knee Arthroscopy, Distal Femoral Osteotomy   - Wound Class: I-Clean   - Wound Class: I-Clean   Surgeon(s) Primary: Jorge Estrada MD  Resident - Assisting: Raymon Garay MD     Allergies   Allergen Reactions     Ambien [Zolpidem] Other (See Comments)     Severe headache     Ciprofloxacin Hives     Sulfa Drugs Hives       Isolation  [unfilled]    Past Medical History   has a past medical history of Anxiety; Depressive disorder, not elsewhere classified; Endometriosis; Low back pain; Major depression, single episode; Menorrhagia; Sprain and strain of unspecified site of knee and leg (7/2010); and Uncomplicated asthma.    Anesthesia Combined General with Block   Dermatome Level     Preop Meds Not applicable   Nerve block Femoral (exparel).  Location:right. Med:Exparel (liposomal bupivacaine). Time given: 0800   Intraop Meds dexamethasone (Decadron)  fentanyl (Sublimaze): 200 mcg total  ondansetron (Zofran): last given at 0809   Local Meds No   Antibiotics cefazolin (Ancef) - last given at 823     Pain Patient Currently in Pain: yes  Comfort: comfortably manageable   PACU meds  fentanyl (Sublimaze): 100 mcg (total dose) last given at 1120   hydroxizine (Vistaril/Atarax): 25 mg (total dose) last given at 1140    PCA / epidural No   Capnography     Telemetry ECG Rhythm: Sinus rhythm      Labs Glucose Lab Results   Component Value Date    GLC 93 12/27/2016       Hgb Lab Results   Component Value Date    HGB 14.1 02/26/2016       INR No results found for: INR   PACU Imaging Not applicable     Wound/Incision Incision/Surgical Site Right Knee (Active)   Number of days:       Incision/Surgical Site 02/13/18 Right;Lateral Knee (Active)   Incision Assessment UTV 2/13/2018 12:04 PM   Closure Adhesive strip(s);Sutures 2/13/2018  10:52 AM   Dressing Intervention Clean, dry, intact 2/13/2018 12:04 PM   Number of days:0      CMS Peripheral Neurovascular WDL: WDL (02/13/18 0555)      Equipment ice pack   Other LDA Airway - Adult/Peds laryngeal mask airway (Active)   Number of days:0        IV Access Peripheral IV 02/13/18 Left Hand (Active)   Site Assessment WDL 2/13/2018 12:00 PM   Line Status Infusing 2/13/2018 11:09 AM   Phlebitis Scale 0-->no symptoms 2/13/2018 11:09 AM   Infiltration Scale 0 2/13/2018 11:09 AM   Number of days:0      Blood Products Not applicable  mL   Intake/Output Date 02/13/18 0700 - 02/14/18 0659   Shift 3097-6831 2914-9842 5175-4102 24 Hour Total   I  N  T  A  K  E   P.O. 100   100    I.V. 1300   1300    Shift Total  (mL/kg) 1400  (15.54)   1400  (15.54)   O  U  T  P  U  T   Blood 100   100    Shift Total  (mL/kg) 100  (1.11)   100  (1.11)   Weight (kg) 90.1 90.1 90.1 90.1        Drains / Arambula     Time of void PreOp Void Prior to Procedure: 0621 (02/13/18 0621)    PostOp     Bladder Scan  up to commode    mL (02/13/18 1200)  tolerating sips     Vitals    B/P: 111/69  T: 98.2  F (36.8  C)    Temp src: Axillary  P:       Heart Rate: 95 (02/13/18 1200)     R: 15  O2:  SpO2: 95 %    O2 Device: Nasal cannula (02/13/18 1200)    Oxygen Delivery: 2 LPM (02/13/18 1200)         Family/support present no   Patient belongings Patient Belongings: (P) body jewelry;cell phone/electronics;clothing;glasses;shoes;wallet  Disposition of Belongings: (P) Locker   Patient transported on cart   DC meds/scripts (obs/outpt) Not applicable     Special needs/considerations None   Tasks needing completion None       Robert Rivas RN  ASCOM [phone]

## 2018-02-13 NOTE — ANESTHESIA PROCEDURE NOTES
Peripheral Nerve Block Procedure Note    Staff:     Anesthesiologist:  RAFITA ADAN  Location: Pre-op  Procedure Start/Stop TImes:     patient identified, IV checked, site marked, risks and benefits discussed, informed consent, monitors and equipment checked, pre-op evaluation, at physician/surgeon's request and post-op pain management      Correct Patient: Yes      Correct Position: Yes      Correct Site: Yes      Correct Procedure: Yes      Correct Laterality:  Yes    Site Marked:  Yes  Procedure details:     Procedure:  Femoral    ASA:  2    Laterality:  Right    Position:  Supine    Sterile Prep: chloraprep      Needle gauge:  21    Needle length (mm):  80    Ultrasound: Yes      Ultrasound used to identify targeted nerve, plexus, or vascular structure and placed a needle adjacent to it      Permanent Image entered into patiient's record      Abnormal pain on injection: No      Blood Aspirated: No      Paresthesias:  No    Bleeding at site: No      Bolus via:  Needle    Infusion Method:  Single Shot    Blood aspirated via catheter: No      Complications:  None  Assessment/Narrative:     Injection made incrementally with aspirations every (mL):  5

## 2018-02-14 ENCOUNTER — APPOINTMENT (OUTPATIENT)
Dept: PHYSICAL THERAPY | Facility: CLINIC | Age: 28
End: 2018-02-14
Attending: ORTHOPAEDIC SURGERY
Payer: COMMERCIAL

## 2018-02-14 PROCEDURE — 25000132 ZZH RX MED GY IP 250 OP 250 PS 637: Performed by: ORTHOPAEDIC SURGERY

## 2018-02-14 PROCEDURE — 25000132 ZZH RX MED GY IP 250 OP 250 PS 637: Performed by: NURSE PRACTITIONER

## 2018-02-14 PROCEDURE — 40000193 ZZH STATISTIC PT WARD VISIT: Performed by: PHYSICAL THERAPIST

## 2018-02-14 PROCEDURE — 97116 GAIT TRAINING THERAPY: CPT | Mod: GP | Performed by: PHYSICAL THERAPIST

## 2018-02-14 PROCEDURE — 12000001 ZZH R&B MED SURG/OB UMMC

## 2018-02-14 PROCEDURE — 97530 THERAPEUTIC ACTIVITIES: CPT | Mod: GP | Performed by: PHYSICAL THERAPIST

## 2018-02-14 PROCEDURE — 97161 PT EVAL LOW COMPLEX 20 MIN: CPT | Mod: GP | Performed by: PHYSICAL THERAPIST

## 2018-02-14 RX ORDER — POLYETHYLENE GLYCOL 3350 17 G/17G
17 POWDER, FOR SOLUTION ORAL DAILY
Status: DISCONTINUED | OUTPATIENT
Start: 2018-02-14 | End: 2018-02-15 | Stop reason: HOSPADM

## 2018-02-14 RX ORDER — AMOXICILLIN 250 MG
1-2 CAPSULE ORAL 2 TIMES DAILY
Status: DISCONTINUED | OUTPATIENT
Start: 2018-02-14 | End: 2018-02-15 | Stop reason: HOSPADM

## 2018-02-14 RX ORDER — OXYCODONE HYDROCHLORIDE 5 MG/1
5-10 TABLET ORAL EVERY 4 HOURS PRN
Qty: 60 TABLET | Refills: 0 | Status: SHIPPED | OUTPATIENT
Start: 2018-02-14 | End: 2018-02-19

## 2018-02-14 RX ADMIN — OXYCODONE HYDROCHLORIDE 5 MG: 5 TABLET ORAL at 07:35

## 2018-02-14 RX ADMIN — SENNOSIDES AND DOCUSATE SODIUM 2 TABLET: 8.6; 5 TABLET ORAL at 20:32

## 2018-02-14 RX ADMIN — CITALOPRAM HYDROBROMIDE 10 MG: 10 TABLET ORAL at 07:52

## 2018-02-14 RX ADMIN — FLUTICASONE PROPIONATE 2 PUFF: 220 AEROSOL, METERED RESPIRATORY (INHALATION) at 07:53

## 2018-02-14 RX ADMIN — POLYETHYLENE GLYCOL 3350 17 G: 17 POWDER, FOR SOLUTION ORAL at 11:57

## 2018-02-14 RX ADMIN — SENNOSIDES AND DOCUSATE SODIUM 2 TABLET: 8.6; 5 TABLET ORAL at 11:57

## 2018-02-14 RX ADMIN — FLUTICASONE PROPIONATE 2 PUFF: 220 AEROSOL, METERED RESPIRATORY (INHALATION) at 20:32

## 2018-02-14 RX ADMIN — OXYCODONE HYDROCHLORIDE 5 MG: 5 TABLET ORAL at 14:14

## 2018-02-14 RX ADMIN — ALBUTEROL SULFATE 2 PUFF: 90 AEROSOL, METERED RESPIRATORY (INHALATION) at 07:53

## 2018-02-14 RX ADMIN — OXYCODONE HYDROCHLORIDE 10 MG: 5 TABLET ORAL at 04:08

## 2018-02-14 RX ADMIN — ACETAMINOPHEN 975 MG: 325 TABLET, FILM COATED ORAL at 17:36

## 2018-02-14 RX ADMIN — OXYCODONE HYDROCHLORIDE 5 MG: 5 TABLET ORAL at 11:11

## 2018-02-14 RX ADMIN — ALBUTEROL SULFATE 2 PUFF: 90 AEROSOL, METERED RESPIRATORY (INHALATION) at 13:21

## 2018-02-14 RX ADMIN — HYDROXYZINE HYDROCHLORIDE 25 MG: 25 TABLET ORAL at 07:53

## 2018-02-14 RX ADMIN — ACETAMINOPHEN 975 MG: 325 TABLET, FILM COATED ORAL at 07:52

## 2018-02-14 RX ADMIN — OXYCODONE HYDROCHLORIDE 5 MG: 5 TABLET ORAL at 01:19

## 2018-02-14 RX ADMIN — HYDROXYZINE HYDROCHLORIDE 25 MG: 25 TABLET ORAL at 01:19

## 2018-02-14 RX ADMIN — OXYCODONE HYDROCHLORIDE 5 MG: 5 TABLET ORAL at 21:39

## 2018-02-14 RX ADMIN — OXYCODONE HYDROCHLORIDE 5 MG: 5 TABLET ORAL at 20:32

## 2018-02-14 RX ADMIN — OXYCODONE HYDROCHLORIDE 5 MG: 5 TABLET ORAL at 17:37

## 2018-02-14 RX ADMIN — ASPIRIN 162 MG: 81 TABLET, COATED ORAL at 07:52

## 2018-02-14 RX ADMIN — HYDROXYZINE HYDROCHLORIDE 25 MG: 25 TABLET ORAL at 19:45

## 2018-02-14 ASSESSMENT — ACTIVITIES OF DAILY LIVING (ADL)
RETIRED_EATING: 0-->INDEPENDENT
TRANSFERRING: 0-->INDEPENDENT
COGNITION: 0 - NO COGNITION ISSUES REPORTED
DRESS: 0-->INDEPENDENT
AMBULATION: 0-->INDEPENDENT
RETIRED_COMMUNICATION: 0-->UNDERSTANDS/COMMUNICATES WITHOUT DIFFICULTY
FALL_HISTORY_WITHIN_LAST_SIX_MONTHS: NO
SWALLOWING: 0-->SWALLOWS FOODS/LIQUIDS WITHOUT DIFFICULTY
BATHING: 0-->INDEPENDENT
TOILETING: 0-->INDEPENDENT

## 2018-02-14 ASSESSMENT — PAIN DESCRIPTION - DESCRIPTORS: DESCRIPTORS: ACHING

## 2018-02-14 NOTE — PLAN OF CARE
Problem: Patient Care Overview  Goal: Plan of Care/Patient Progress Review  Pt evaluated this morning. She understands TTWB status but prefers to NWB presently.     Discharge Planner PT   Patient plan for discharge: discharge to her grandmother's home. Out-pt PT  Current status: She is able to lock and unlock the brace herself and knows to lock it for OOB activity. Instructed in seated heelslides for AROM as tolerated.  She achieved 55 degrees knee flexion in sitting.  Walked 100' with ww with supervision only. Climbed stairs independently .  She will not have to climb stairs at her home or at her grandmother's.  Pt's crutches are coming from home this afternoon. She prefers to use the walker until her crutches arrive.  Fatigued with activity.   Pt's is concerned about pain control when the block wears off.  Barriers to return to prior living situation: None. She will have assist. She is an experienced crutch walker.   Recommendations for discharge: home with eventual out-pt PT follow-up.   Rationale for recommendations: Pt is moving well. Understands precautions.  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  Good insight into her situation.        Entered by: Elsa Walsh 02/14/2018 9:17 AM

## 2018-02-14 NOTE — PLAN OF CARE
Problem: Patient Care Overview  Goal: Plan of Care/Patient Progress Review  Pt demonstrated independent crutch gait this afternoon. She is up with brace locked and prefers to NWB rather than TTWB presently.   Pt states pain is well controlled and received additional pain meds just prior to PT.  Independent with transfers.  Goals met.  No additional DME needs identified.  Pt will discharge to her grandmother's home tomorrow.  She has met goals for PT and is discharged from PT today.     Physical Therapy Discharge Summary    Reason for therapy discharge:    Discharged to home with outpatient therapy.    Progress towards therapy goal(s). See goals on Care Plan in [facility] electronic health record for goal details.  Goals met    Therapy recommendation(s):    Continued therapy is recommended.  Rationale/Recommendations:  Out-pt PT per Dr. Estrada's protocal. .  Continue home exercise program.

## 2018-02-14 NOTE — PROGRESS NOTES
All VSS, pt afebrile. A&Ox4. Right TKA with femur, post op day one. Dressing C/D/I, UTV incision. Denies numbness and tingling. Denies nausea, SOB and chest pain. Pain managed by ice and oxycodone po q 3hr 3x 5mg (@0734, 1111 &1420). Up with stand by assist and walker, toe touch weight bearing. Voiding spontaneously, last BM 2/12. Managed with senna and Miralax. RPIV, saline locked. Call light in reach, notify MD with changes.

## 2018-02-14 NOTE — PLAN OF CARE
Problem: Patient Care Overview  Goal: Plan of Care/Patient Progress Review  Pt. A&Ox4. VSS. Afebrile. Lung sounds CTA. Maintaining sats w/ 1.5L O2 via NC, weaned to RA this am. IS encouraged. Bowel sounds active, LBM 2/12, flatus +. PP+ DP+. CMS and neuro's are intact. Denies numbness and tingling in all extremities. Denies nausea, shortness of breath, and chest pain. RLE pain managed w/ scheduled Tylenol, prn Oxycodone 10mg, Vistaril 25mg, and ice applied. Voids spontaneously without difficulty in the BSC. Tolerating regular diet. RLE incisional dressing is CDI. HKB in place, locked in ext. When up, can be unlocked w/ ROMAT when in bed. Pt up with SBA. PIV is patent and SL, new IV placed overnight. PCD's on BLE's. Bilateral heels are elevated off the bed. Call light is within reach, pt is able to make needs known, and is resting comfortably between cares. Will continue to monitor.

## 2018-02-14 NOTE — PROGRESS NOTES
Orthopaedic Surgery Progress Note 02/14/2018    E: No acute events overnight.    S: Pain well controlled. Denies numbness or tingling. Plan of care reviewed and questions answered    O:  Temp: 97.8  F (36.6  C) Temp src: Axillary BP: 98/53   Heart Rate: 92 Resp: 16 SpO2: 91 % O2 Device: None (Room air) Oxygen Delivery: 1/2 LPM    Exam:  Gen: No acute distress, resting comfortably in bed.  Resp: Non-labored breathing  RLE:   - HKB in place and appropriately fitting  - Dressing c/d/i  - SILT s/s/dp/sp/t nerve root distributions  - Motor intact EHL/TA/GSC  - Foot WWP with easily palpable DP pulse      Assessment/Plan: Janice Wade is a 27 year old female s/p right knee arthroscopy and lateral distal femoral opening wedge osteotomy on 02/13/18 with Dr. Estrada.     Activity: Up with assist.  Weight bearing status: TTWB RLE  Antibiotics: Ancef x 24 hours.  Diet: Begin with clear fluids and progress diet as tolerated.  DVT prophylaxis:  mg QD and mechanical while in the hospital, discharge on  mg QD x 4 weeks.  Bracing/Splinting: HKB to be on full time. Brace to be locked in full extension when up and ambulating. OK to be unlocked when sitting or lying. No range of motion restrictions  Elevation: Elevate RLE on pillows to keep above the level of the heart as much as possible.  Wound Care: Dressing change at bedside by Ortho on POD #3.  Pain management: transition from IV to orals as tolerated.  X-rays: No post-operative XR needed  Physical Therapy: Gait training, ROM, ADL's.  Occupational Therapy: ADLs  Labs: No post-op Hgb. Will plan to follow clinically     Follow-up: Clinic with Dr. Estrada in 1 week with non-weight bearing AP/Lateral of the right knee     Disposition: Pending progress with therapies, pain control on orals, and medical stability, anticipate discharge to home today following therapy vs. Tomorrow pending pain control and mobilization      Hector Garay MD 02/14/2018  Orthopaedic  Surgery Resident, PGY-5  Pager: [phone]    For questions about this patient, please attempt to contact me at my pager prior to contacting the orthopaedics resident on call. Thank you!

## 2018-02-14 NOTE — PLAN OF CARE
Problem: Patient Care Overview  Goal: Plan of Care/Patient Progress Review  OT: orders received and acknowledged. Following discussion with patient, no skilled OT needs. Has had many surgeries on R knee. Has DME in place at home. Will complete orders.

## 2018-02-15 VITALS
OXYGEN SATURATION: 93 % | BODY MASS INDEX: 35.2 KG/M2 | WEIGHT: 198.63 LBS | TEMPERATURE: 99 F | HEART RATE: 86 BPM | RESPIRATION RATE: 16 BRPM | SYSTOLIC BLOOD PRESSURE: 117 MMHG | DIASTOLIC BLOOD PRESSURE: 71 MMHG | HEIGHT: 63 IN

## 2018-02-15 PROCEDURE — 25000132 ZZH RX MED GY IP 250 OP 250 PS 637: Performed by: NURSE PRACTITIONER

## 2018-02-15 PROCEDURE — 25000132 ZZH RX MED GY IP 250 OP 250 PS 637: Performed by: ORTHOPAEDIC SURGERY

## 2018-02-15 RX ORDER — AMOXICILLIN 250 MG
1-2 CAPSULE ORAL 2 TIMES DAILY
Qty: 60 TABLET | Refills: 0 | Status: SHIPPED | OUTPATIENT
Start: 2018-02-15

## 2018-02-15 RX ORDER — BISACODYL 10 MG
10 SUPPOSITORY, RECTAL RECTAL DAILY PRN
Status: DISCONTINUED | OUTPATIENT
Start: 2018-02-15 | End: 2018-02-15 | Stop reason: HOSPADM

## 2018-02-15 RX ADMIN — OXYCODONE HYDROCHLORIDE 5 MG: 5 TABLET ORAL at 03:51

## 2018-02-15 RX ADMIN — CITALOPRAM HYDROBROMIDE 10 MG: 10 TABLET ORAL at 08:44

## 2018-02-15 RX ADMIN — BISACODYL 10 MG: 10 SUPPOSITORY RECTAL at 10:24

## 2018-02-15 RX ADMIN — ACETAMINOPHEN 975 MG: 325 TABLET, FILM COATED ORAL at 03:51

## 2018-02-15 RX ADMIN — SENNOSIDES AND DOCUSATE SODIUM 2 TABLET: 8.6; 5 TABLET ORAL at 08:44

## 2018-02-15 RX ADMIN — FLUTICASONE PROPIONATE 2 PUFF: 220 AEROSOL, METERED RESPIRATORY (INHALATION) at 08:44

## 2018-02-15 RX ADMIN — POLYETHYLENE GLYCOL 3350 17 G: 17 POWDER, FOR SOLUTION ORAL at 08:44

## 2018-02-15 RX ADMIN — ASPIRIN 162 MG: 81 TABLET, COATED ORAL at 08:44

## 2018-02-15 RX ADMIN — OXYCODONE HYDROCHLORIDE 5 MG: 5 TABLET ORAL at 08:43

## 2018-02-15 ASSESSMENT — PAIN DESCRIPTION - DESCRIPTORS: DESCRIPTORS: ACHING

## 2018-02-15 NOTE — PLAN OF CARE
Problem: Patient Care Overview  Goal: Individualization & Mutuality  Outcome: Improving  Patient A&O x4, lungs sound clear, Bowel sound active- passing gas but no BM since 2/12/18- offered suppository but patient declined , Denied CP, lightheadedness, dizziness, numbness, tingling and SOB,dressing clean,dry and intact, brace on and locked, drinking well and voiding spontaneously without difficulties, able to wiggle toes, CMS intact, pain tolerable and taking 5 mg of oxycodone for pain,  incentive spirometer encouraged and done several times, heels elevated off bed, demonstrates the ability to use call light appropriately, will continue to monitor patient.

## 2018-02-15 NOTE — PROGRESS NOTES
Discharge instructions read and explained to patient. IV removed, dressing supplies and medication given to patient. Discharged patient home accompanied by aunt.

## 2018-02-15 NOTE — PROGRESS NOTES
Orthopaedic Surgery Progress Note 02/15/2018    E: No acute events overnight.    S: Pain well controlled. Continues to mobilize well. Slept well overnight Plan of care reviewed and questions answered. Hopeful for discharge today.     O:  Temp: 98  F (36.7  C) Temp src: Oral BP: 127/61   Heart Rate: 99 Resp: 14 SpO2: 94 %        Exam:  Gen: No acute distress, resting comfortably in bed.  Resp: Non-labored breathing  RLE:   - HKB in place and appropriately fitting  - Dressing c/d/i  - SILT s/s/dp/sp/t nerve root distributions  - Motor intact EHL/TA/GSC  - Foot WWP with easily palpable DP pulse      Assessment/Plan: Janice Wade is a 27 year old female s/p right knee arthroscopy and lateral distal femoral opening wedge osteotomy on 02/13/18 with Dr. Estrada.     Activity: Up with assist.  Weight bearing status: TTWB RLE  Antibiotics: Ancef x 24 hours.  Diet: Begin with clear fluids and progress diet as tolerated.  DVT prophylaxis:  mg QD and mechanical while in the hospital, discharge on  mg QD x 4 weeks.  Bracing/Splinting: HKB to be on full time. Brace to be locked in full extension when up and ambulating. OK to be unlocked when sitting or lying. No range of motion restrictions  Elevation: Elevate RLE on pillows to keep above the level of the heart as much as possible.  Wound Care: Dressing change at bedside by Ortho on POD #3.  Pain management: transition from IV to orals as tolerated.  X-rays: No post-operative XR needed  Physical Therapy: Gait training, ROM, ADL's.  Occupational Therapy: ADLs  Labs: No post-op Hgb. Will plan to follow clinically     Follow-up: Clinic with Dr. Estrada in 1 week with non-weight bearing AP/Lateral of the right knee     Disposition: Pending progress with therapies, pain control on orals, and medical stability, anticipate discharge to home today following therapy vs. Tomorrow pending pain control and mobilization      Hector Garay MD 02/15/2018  Orthopaedic Surgery  Resident, PGY-5  Pager: [phone]    For questions about this patient, please attempt to contact me at my pager prior to contacting the orthopaedics resident on call. Thank you!

## 2018-02-15 NOTE — PLAN OF CARE
Problem: Patient Care Overview  Goal: Plan of Care/Patient Progress Review  Outcome: Improving  A/Ox's 4. Pt rated pain as tolerable. Oxycodone, Tylenol and Ice packs given for pain control. Dressing CDI. Hinged knee brace locked in extension. CMS intact. Tolerated regular diet. Denied any nausea, CP, SOB, lightheadedness or dizziness. Voiding without pain or difficulty. Passing flatus. Up with SBA. Encouraged increased/continued IS use. Bilateral heels elevated off bed. Resting in bed at this time with call light in reach. Able to make needs known. Continue to monitor.

## 2018-02-16 DIAGNOSIS — Z98.890 S/P RIGHT KNEE SURGERY: Primary | ICD-10-CM

## 2018-02-16 NOTE — DISCHARGE SUMMARY
ORTHOPAEDIC DISCHARGE SUMMARY     Date of Admission: 2/13/2018  Date of Discharge: 2/15/2018 12:02 PM  Disposition: Home  Staff Physician: No att. providers found  Primary Care Provider: Gustavo Harrison    DISCHARGE DIAGNOSIS:  Genu Valgum    PROCEDURES: Procedure(s):  ARTHROSCOPY KNEE  OSTEOTOMY FEMUR DISTAL  on 2/13/2018    BRIEF HISTORY:  Janice Wade is a 27 year old female with a complex history with regards to her RLE. In brief she sustained a complex right knee ligamentous injury and tibial plateau fracture which ultimately progressed to lateral compartment degenerative change. After the risks, benefits, and alternatives were discussed she elected to proceed with the above stated procedure.     HOSPITAL COURSE:    Surgery was uncomplicated. Janice Wade has done well post-operatively. Medicine was consulted post operatively to aid in management of medical comorbidities. See final recommendations below. The patient received routine nursing cares and is medically stable. Vital signs are stable. The patient is tolerating a regular diet without GI distress/nausea or vomiting. Voiding spontaneously. All PT/OT goals have been met for safe mobility. Pain is now controlled on oral medications which will be available at [facility]. Stool softeners have been used while taking pain medications to help prevent constipation. Janice Wade is deemed medically safe to discharge to home.     Antibiotics:  Ancef given periop and 24 hours postop.   DVT prophylaxis:   mg daily for 4 weeks  PT Progress: He has met PT/OT goals for safe mobility.    Pain Meds:  He was weaned off all IV pain meds by discharge.  Inpatient Events: No significant events or complications.     Discharge orders and instructions as below.    FOLLOWUP:    Follow up with Dr. Estrada at 1 weeks postoperatively.    Appointments on [facility]. Call [phone] if you haven't heard regarding these appointments  within 7 days of discharge.    PLANNED DISCHARGE ORDERS:        Discharge Medication List as of 2/15/2018 10:44 AM      START taking these medications    Details   senna-docusate (SENOKOT-S;PERICOLACE) 8.6-50 MG per tablet Take 1-2 tablets by mouth 2 times daily, Disp-60 tablet, R-0, E-Prescribe      oxyCODONE IR (ROXICODONE) 5 MG tablet Take 1-2 tablets (5-10 mg) by mouth every 4 hours as needed for other (pain control or improvement in physical function. Hold dose for analgesic side effects.), Disp-60 tablet, R-0, Local Print      aspirin EC 81 MG EC tablet Take 2 tablets (162 mg) by mouth daily for 28 days, Disp-56 tablet, R-0, E-Prescribe         CONTINUE these medications which have NOT CHANGED    Details   fluticasone (FLOVENT HFA) 220 MCG/ACT Inhaler Inhale 2 puffs into the lungs 2 times daily, Historical      hydrOXYzine (ATARAX) 25 MG tablet Take 1 tablet (25 mg) by mouth every 6 hours as needed for itching (and nausea), Disp-30 tablet, R-0, E-Prescribe      acetaminophen (TYLENOL) 325 MG tablet Take 2 tablets (650 mg) by mouth every 4 hours as needed for other (mild pain), Disp-100 tablet, R-0, E-Prescribe      albuterol (PROAIR HFA/PROVENTIL HFA/VENTOLIN HFA) 108 (90 BASE) MCG/ACT Inhaler Inhale 2 puffs into the lungs every 6 hours, Historical      CITALOPRAM HYDROBROMIDE PO Take 10 mg by mouth daily, Historical      ALPRAZOLAM PO Take 0.25 mg by mouth as needed for anxiety, Historical      blood glucose monitoring (NO BRAND SPECIFIED) meter device kit Use to test blood sugar 4 times daily or as directed.Disp-1 kit, [id number]      blood glucose monitoring (ULTRA THIN 30G) lancets Use to test blood sugar 4 times daily or as directed., Disp-1 Box, R-5, E-Prescribe      Glucose Blood (BLOOD GLUCOSE TEST STRIPS) STRP 4 times a day check, Disp-100 each, R-5, E-Prescribe               Discharge Procedure Orders  Reason for your hospital stay   Order Comments: You were admitted for observation, pain  control, and therapies following your distal femoral osteotomy.     Adult [facility] Follow-up and recommended labs and tests   Order Comments: You are scheduled to follow up with Dr. Estrada 02/18/2018    Appointments on [place] and/or [facility] (with [facility] or [facility] provider or service). Call [phone] if you haven't heard regarding these appointments within 7 days of discharge.     Activity   Order Comments: Your activity upon discharge:  -- OK to be up and ambulating with hinged knee brace locked in extension  -- OK for knee ROM 0-90 when in therapy or sitting and resting   Order Specific Question Answer Comments   Is discharge order? Yes      Wound care and dressings   Order Comments: Instructions to care for your wound at home:  -- OK to remove dressing at POD#3   -- OK to shower on the day the dressing is removed   -- Replace dressing with a clean dry bandage after bathing  -- Keep wound covered through 1st follow up clinic appointment.     Diet   Order Comments: Follow this diet upon discharge: Orders Placed This Encounter     Regular Diet Adult   Order Specific Question Answer Comments   Is discharge order? Yes            Hector Garay MD 02/16/18  Orthopaedic Surgery Resident, PGY-4  Pager: [phone]

## 2018-02-19 ENCOUNTER — OFFICE VISIT (OUTPATIENT)
Dept: ORTHOPEDICS | Facility: CLINIC | Age: 28
End: 2018-02-19
Payer: COMMERCIAL

## 2018-02-19 ENCOUNTER — RADIANT APPOINTMENT (OUTPATIENT)
Dept: GENERAL RADIOLOGY | Facility: CLINIC | Age: 28
End: 2018-02-19
Attending: ORTHOPAEDIC SURGERY
Payer: COMMERCIAL

## 2018-02-19 DIAGNOSIS — M21.061 GENU VALGUM, ACQUIRED, RIGHT: Primary | ICD-10-CM

## 2018-02-19 DIAGNOSIS — Z98.890 S/P RIGHT KNEE SURGERY: ICD-10-CM

## 2018-02-19 RX ORDER — TRAMADOL HYDROCHLORIDE 50 MG/1
50-100 TABLET ORAL EVERY 6 HOURS PRN
Qty: 60 TABLET | Refills: 0 | Status: SHIPPED | OUTPATIENT
Start: 2018-02-19

## 2018-02-19 NOTE — PROGRESS NOTES
DIAGNOSIS:   1. History of right bicondylar tibial plateau fracture with lateral compartment chondrosis  2. ACL deficient knee  3. Posterior lateral corner injury with fibular collateral ligament and popliteus tendon injury  4. Wound breakdown with surgical site infection  5. Genu valgum    DATE OF SURGERY:  1. Right knee spanning external fixator application (April 26, 2015)  2. Removal of right knee spanning external fixator, open reduction internal fixation of bicondylar tibial plateau fracture (May 8, 2015-Dr. Jett Cruz)  3. Right knee ACI biopsy, lateral tibial plateau chondroplasty, fluoroscopic examination under anesthesia Juliette (November 17, 2015-Dr. Estrada)  4. Right knee anterior cruciate ligament reconstruction with hamstring autograft, posterior lateral corner reconstruction with fibular collateral ligament reconstruction with allograft popliteal fibular ligament reconstruction with allograft, proximal tibial removal of hardware, peroneal nerve neurolysis (February 26, 2016-Dr. Estrada)  5. Right knee examination under anesthesia with open irrigation and debridement and primary closure of laterally based wound (March 29, 2016-Dr. Estrada)  6. Right knee arthroscopy and lateral femoral compartment chondroplasty, hardware removal right proximal tibia, debridement of cyclops lesion (October 17, 2017-Dr. Estrada)  7. Right knee arthroscopy with distal femoral osteotomy 7  correction (02/13/2018-Dr. Estrada)    HISTORY:  Janice Wade is a 27-year-old female who returns for one-week follow-up of her distal femoral opening wedge osteotomy.  She has done well postoperatively and feels that her block is still active.  She has been able to discontinue the oxycodone for pain control because she is having nightmares and night sweats.  She has been using Tylenol alone for pain control with some breakthrough symptoms and is interested in trying an alternative pain medication.  She has been having no wound  healing issues but does note soft tissue swelling at the site of her incision.    EXAM:     General: Awake, Alert, and oriented. Articulates and communicates with a normal affect     Right lower Extremity:    Incisions well healed without evidence of infection.  Notable soft tissue swelling deep to lateral incision    Normal post-operative effusion and ecchymosis    Range of motion and stability exam not performed    Diminished sensation along the anterior medial face of the tibia.  Intact sensation distally in the foot and ankle.  Motor intact extensor hallucis longus tibialis anterior and gastrocsoleus complex per    IMAGING:  Right knee radiographs obtained in clinic today demonstrates stable implant position with appropriate correction of her genu valgum.  There is no acute osseous abnormalities implant subsidence loosening or failure.    ASSESSMENT:  1. 1 week status post right distal femoral osteotomy    PLAN:     Weightbearing as tolerated    Range of motion as tolerated    Sutures removed in clinic    Leave steri-strips in place until they fall off    OK to shower allowing water to run over incision    No soaking, scrubbing, baths, or lake for 1 additional week    Continue PT as scheduled     Pain medications reviewed and will plan to transition to tramadol over the next several weeks to avoid the side effects of oxycodone..     Operative report provided and arthroscopic images reviewed    Follow up at 6 weeks from the date of surgery with no new X-Rays needed

## 2018-02-19 NOTE — LETTER
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 She will follow up with me in 6 weeks to be re-evaluated.         Electronically signed by Melva Estrada MD & Yin Gil ATC

## 2018-02-19 NOTE — LETTER
2/19/2018       RE: Janice Wade  [address] [place] [address]     Dear Colleague,    Thank you for referring your patient, Janice Wade, to the [facility] ORTH[facility]IC at [facility]. Please see a copy of my visit note below.    DIAGNOSIS:   1. History of right bicondylar tibial plateau fracture with lateral compartment chondrosis  2. ACL deficient knee  3. Posterior lateral corner injury with fibular collateral ligament and popliteus tendon injury  4. Wound breakdown with surgical site infection  5. Genu valgum    DATE OF SURGERY:  1. Right knee spanning external fixator application (April 26, 2015)  2. Removal of right knee spanning external fixator, open reduction internal fixation of bicondylar tibial plateau fracture (May 8, 2015-Dr. Jett Cruz)  3. Right knee ACI biopsy, lateral tibial plateau chondroplasty, fluoroscopic examination under anesthesia Juliette (November 17, 2015-Dr. Estrada)  4. Right knee anterior cruciate ligament reconstruction with hamstring autograft, posterior lateral corner reconstruction with fibular collateral ligament reconstruction with allograft popliteal fibular ligament reconstruction with allograft, proximal tibial removal of hardware, peroneal nerve neurolysis (February 26, 2016-Dr. Estrada)  5. Right knee examination under anesthesia with open irrigation and debridement and primary closure of laterally based wound (March 29, 2016-Dr. Estrada)  6. Right knee arthroscopy and lateral femoral compartment chondroplasty, hardware removal right proximal tibia, debridement of cyclops lesion (October 17, 2017-Dr. Estrada)  7. Right knee arthroscopy with distal femoral osteotomy 7  correction (02/13/2018-Dr. Estrada)    HISTORY:  Janice Wade is a 27-year-old female who returns for one-week follow-up of her distal femoral opening wedge osteotomy.  She has done well postoperatively and feels that her block is still  active.  She has been able to discontinue the oxycodone for pain control because she is having nightmares and night sweats.  She has been using Tylenol alone for pain control with some breakthrough symptoms and is interested in trying an alternative pain medication.  She has been having no wound healing issues but does note soft tissue swelling at the site of her incision.    EXAM:     General: Awake, Alert, and oriented. Articulates and communicates with a normal affect     Right lower Extremity:    Incisions well healed without evidence of infection.  Notable soft tissue swelling deep to lateral incision    Normal post-operative effusion and ecchymosis    Range of motion and stability exam not performed    Diminished sensation along the anterior medial face of the tibia.  Intact sensation distally in the foot and ankle.  Motor intact extensor hallucis longus tibialis anterior and gastrocsoleus complex per    IMAGING:  Right knee radiographs obtained in clinic today demonstrates stable implant position with appropriate correction of her genu valgum.  There is no acute osseous abnormalities implant subsidence loosening or failure.    ASSESSMENT:  1. 1 week status post right distal femoral osteotomy    PLAN:     Weightbearing as tolerated    Range of motion as tolerated    Sutures removed in clinic    Leave steri-strips in place until they fall off    OK to shower allowing water to run over incision    No soaking, scrubbing, baths, or lake for 1 additional week    Continue PT as scheduled     Pain medications reviewed and will plan to transition to tramadol over the next several weeks to avoid the side effects of oxycodone..     Operative report provided and arthroscopic images reviewed    Follow up at 6 weeks from the date of surgery with no new X-Rays needed          Patient seen and examined with the resident.     Assesment: 1 week following dfo for severe OA of lateral compartment with genu valgus    Plan: ttwb x  6 weeks, rom as aguilar, tramadol for pain, PT, f/u 6 weeks with new radiographs.    I agree with history, physical and imaging as well as the assessment and plan as detailed by Dr. Garay.       Again, thank you for allowing me to participate in the care of your patient.      Sincerely,    Jorge Estrada MD

## 2018-02-19 NOTE — PROGRESS NOTES
Patient seen and examined with the resident.     Assesment: 1 week following dfo for severe OA of lateral compartment with genu valgus    Plan: ttwb x 6 weeks, rom as aguilar, tramadol for pain, PT, f/u 6 weeks with new radiographs.    I agree with history, physical and imaging as well as the assessment and plan as detailed by Dr. Garay.

## 2018-02-19 NOTE — MR AVS SNAPSHOT
After Visit Summary   2/19/2018    Janice Wade    MRN: [id number]           Patient Information     Date Of Birth          1990        Visit Information        Provider Department      2/19/2018 10:40 AM Jorge Estrada MD [facility]        Today's Diagnoses     Genu valgum, acquired, right    -  1       Follow-ups after your visit        Additional Services     PHYSICAL THERAPY REFERRAL       *This order will print in the [facility] Central Scheduling Office*    [facility] provides Physical Therapy evaluation and treatment and many specialty services across the [place] system.  If requesting a specialty program, please choose from the list below.    Call one number to schedule at any [facility] location   [phone].    Treatment: Evaluation & Treatment  Special Instructions/Modalities:     POSTOPERATIVE PLAN:  The patient will be toe touch weightbearing x6 weeks.  Range of motion as tolerated.  Hinged knee brace on and locked when up and around for the first 6 weeks, off or locked for sitting or doing therapy, etc.  No CPM.  Aspirin for DVT prophylaxis starting on postoperative day #1 [id number].       Special Programs: None    Please be aware that coverage of these services is subject to the terms and limitations of your health insurance plan.  Call member services at your health plan with any benefit or coverage questions.      **Note to Provider** To refer patients to therapy outside of the location list, change the order class to External Referral in the order composer.                  Follow-up notes from your care team     Return in 5 weeks (on 3/26/2018).      Your next 10 appointments already scheduled     Mar 26, 2018  9:00 AM CDT   (Arrive by 8:45 AM)   Cartilage Restoration Return Visit with MD ANTWON Avalos [facility] ([facility]  [place])    [address]  [address] [facility] [address] [phone]              Who to contact     Please call your clinic at [phone] to:    Ask questions about your health    Make or cancel appointments    Discuss your medicines    Learn about your test results    Speak to your doctor            Additional Information About Your Visit        [employer] Information     [email or web address] gives you secure access to your electronic health record. If you see a primary care provider, you can also send messages to your care team and make appointments. If you have questions, please call your primary care clinic.  If you do not have a primary care provider, please call [phone] and they will assist you.      [employer] is an electronic gateway that provides easy, online access to your medical records. With [employer], you can request a clinic appointment, read your test results, renew a prescription or communicate with your care team.     To access your existing account, please contact your [facility] or call [phone] for assistance.        Care EveryWhere ID     This is your Care EveryWhere ID. This could be used by other organizations to access your [facility] medical records  [id number]        Your Vitals Were     Last Period                   02/19/2016 (Exact Date)            Blood Pressure from Last 3 Encounters:   02/15/18 117/71   10/17/17 121/77   12/27/16 108/74    Weight from Last 3 Encounters:   02/13/18 90.1 kg (198 lb 10.2 oz)   10/17/17 88.5 kg (195 lb 3.2 oz)   10/16/17 88.6 kg (195 lb 5.2 oz)              We Performed the Following     PHYSICAL THERAPY REFERRAL          Today's Medication Changes          These changes are accurate as of 2/19/18 12:01 PM.  If you have any questions, ask your nurse or doctor.               Start taking these medicines.        Dose/Directions    traMADol 50 MG tablet   Commonly known as:  ULTRAM   Started by:  Jorge Estrada  MD Neftali        Dose:   mg   Take 1-2 tablets ( mg) by mouth every 6 hours as needed for pain maximum 8 tablet(s) per day   Quantity:  60 tablet   Refills:  0         Stop taking these medicines if you haven't already. Please contact your care team if you have questions.     oxyCODONE IR 5 MG tablet   Commonly known as:  ROXICODONE   Stopped by:  Jorge Estrada MD                Where to get your medicines      Some of these will need a paper prescription and others can be bought over the counter.  Ask your nurse if you have questions.     Bring a paper prescription for each of these medications     traMADol 50 MG tablet                Primary Care Provider Office Phone # Fax #    Gustavo Harrison PA-C [phone]       [facility] [address]  [place] [address]        Equal Access to Services     [facility]: Hadii socorro nair hadasho Soomaali, waaxda luqadaha, qaybta kaalmada adeegyada, betito bullock . So [facility] [phone].    ATENCIÓN: Si habla español, tiene a hannah disposición servicios gratuitos de asistencia lingüística. [facility] [phone].    We comply with applicable federal civil rights laws and [place] laws. We do not discriminate on the basis of race, color, national origin, age, disability, sex, sexual orientation, or gender identity.            Thank you!     Thank you for choosing [facility] ORTHOPA[facility]  for your care. Our goal is always to provide you with excellent care. Hearing back from our patients is one way we can continue to improve our services. Please take a few minutes to complete the written survey that you may receive in the mail after your visit with us. Thank you!             Your Updated Medication List - Protect others around you: Learn how to safely use, store and throw away your medicines at www.disposemymeds.org.          This list is accurate as of 2/19/18 12:01 PM.  Always use your most recent  med list.                   Brand Name Dispense Instructions for use Diagnosis    acetaminophen 325 MG tablet    TYLENOL    100 tablet    Take 2 tablets (650 mg) by mouth every 4 hours as needed for other (mild pain)    Closed fracture of right tibial plateau with routine healing, subsequent encounter       albuterol 108 (90 BASE) MCG/ACT Inhaler    PROAIR HFA/PROVENTIL HFA/VENTOLIN HFA     Inhale 2 puffs into the lungs every 6 hours        ALPRAZOLAM PO      Take 0.25 mg by mouth as needed for anxiety        aspirin 81 MG EC tablet     56 tablet    Take 2 tablets (162 mg) by mouth daily for 28 days    Post-traumatic osteoarthritis of right knee       blood glucose monitoring lancets     1 Box    Use to test blood sugar 4 times daily or as directed.    Hyperglycemia       blood glucose monitoring meter device kit    no brand specified    1 kit    Use to test blood sugar 4 times daily or as directed.    Hyperglycemia       BLOOD GLUCOSE TEST STRIPS Strp     100 each    4 times a day check    Hyperglycemia       CITALOPRAM HYDROBROMIDE PO      Take 10 mg by mouth daily        fluticasone 220 MCG/ACT Inhaler    FLOVENT HFA     Inhale 2 puffs into the lungs 2 times daily        hydrOXYzine 25 MG tablet    ATARAX    30 tablet    Take 1 tablet (25 mg) by mouth every 6 hours as needed for itching (and nausea)    Closed fracture of right tibial plateau with routine healing, subsequent encounter       senna-docusate 8.6-50 MG per tablet    SENOKOT-S;PERICOLACE    60 tablet    Take 1-2 tablets by mouth 2 times daily    Post-traumatic osteoarthritis of right knee       traMADol 50 MG tablet    ULTRAM    60 tablet    Take 1-2 tablets ( mg) by mouth every 6 hours as needed for pain maximum 8 tablet(s) per day

## 2018-02-23 ENCOUNTER — TRANSFERRED RECORDS (OUTPATIENT)
Dept: HEALTH INFORMATION MANAGEMENT | Facility: CLINIC | Age: 28
End: 2018-02-23

## 2018-02-26 ENCOUNTER — TELEPHONE (OUTPATIENT)
Dept: ORTHOPEDICS | Facility: CLINIC | Age: 28
End: 2018-02-26

## 2018-02-26 NOTE — TELEPHONE ENCOUNTER
[facility] called as patient is under their care for outpatient physical therapy.  Therapist is requesting a copy of the therapy orders which were faxed to [phone].    Moriah Duckworth LPN

## 2018-03-22 DIAGNOSIS — Z98.890 S/P RIGHT KNEE SURGERY: Primary | ICD-10-CM

## 2018-03-26 ENCOUNTER — OFFICE VISIT (OUTPATIENT)
Dept: ORTHOPEDICS | Facility: CLINIC | Age: 28
End: 2018-03-26
Payer: COMMERCIAL

## 2018-03-26 ENCOUNTER — RADIANT APPOINTMENT (OUTPATIENT)
Dept: GENERAL RADIOLOGY | Facility: CLINIC | Age: 28
End: 2018-03-26
Payer: COMMERCIAL

## 2018-03-26 DIAGNOSIS — Z98.890 S/P RIGHT KNEE SURGERY: Primary | ICD-10-CM

## 2018-03-26 DIAGNOSIS — Z98.890 S/P RIGHT KNEE SURGERY: ICD-10-CM

## 2018-03-26 NOTE — LETTER
[facility]  [address] [address]        March 26, 2018    Regarding:  Janice Wade  [address] [facility] [address]              To Whom It May Concern;      Janice Wade may return to work when she feels comfortable with lifting.       Sincerely,        Jorge Estrada MD & Yin Gil ATC

## 2018-03-26 NOTE — LETTER
3/26/2018       RE: Janice Wade  [address] [facility] [address]     Dear Colleague,    Thank you for referring your patient, Janice Wade, to the [facility]. Please see a copy of my visit note below.    DIAGNOSIS:   1. Genu valgum malalignment following devastating tibial plateau fracture  2. Status post ACL and posterior lateral corner reconstruction  3. Lateral compartment osteoarthritis    PROCEDURES:  1. Right knee arthroscopy, distal femoral osteotomy date of surgery 2/13/2018    HISTORY:  Armando is doing very well.  She really has very minimal pain.  She describes it only is a small bruise.  She states that she now feels better than she has since the accident.  She has been observant of our crutches and her brace.    EXAM:     General: Awake, Alert, and oriented. Articulates and communicates with a normal affect     Right lower Extremity:    Incisions well healed without evidence of infection    Range of motion 0 to one-point    Ligaments remain stable    Neurovascularly intact    IMAGING:  AP and lateral radiographs show progressive union of her right distal femoral osteotomy    ASSESSMENT:  1. 6 weeks status post right distal femoral osteotomy for lateral compartment osteoarthritis following devastating tibial plateau fracture    PLAN:   Weightbearing as tolerated  Range of motion is tolerated  Wean from crutches, wean from brace  Follow-up 6 weeks with repeat AP and lateral radiographs.  We will going to give her an order for the radiographs to be done to her home clinic as this will be cheaper for her.      Again, thank you for allowing me to participate in the care of your patient.      Sincerely,    Jorge Estrada MD

## 2018-03-26 NOTE — PROGRESS NOTES
DIAGNOSIS:   1. Genu valgum malalignment following devastating tibial plateau fracture  2. Status post ACL and posterior lateral corner reconstruction  3. Lateral compartment osteoarthritis    PROCEDURES:  1. Right knee arthroscopy, distal femoral osteotomy date of surgery 2/13/2018    HISTORY:  Armando is doing very well.  She really has very minimal pain.  She describes it only is a small bruise.  She states that she now feels better than she has since the accident.  She has been observant of our crutches and her brace.    EXAM:     General: Awake, Alert, and oriented. Articulates and communicates with a normal affect     Right lower Extremity:    Incisions well healed without evidence of infection    Range of motion 0 to one-point    Ligaments remain stable    Neurovascularly intact    IMAGING:  AP and lateral radiographs show progressive union of her right distal femoral osteotomy    ASSESSMENT:  1. 6 weeks status post right distal femoral osteotomy for lateral compartment osteoarthritis following devastating tibial plateau fracture    PLAN:   Weightbearing as tolerated  Range of motion is tolerated  Wean from crutches, wean from brace  Follow-up 6 weeks with repeat AP and lateral radiographs.  We will going to give her an order for the radiographs to be done to her home clinic as this will be cheaper for her.

## 2018-03-26 NOTE — MR AVS SNAPSHOT
After Visit Summary   3/26/2018    Janice Wade    MRN: [id number]           Patient Information     Date Of Birth          1990        Visit Information        Provider Department      3/26/2018 9:00 AM Jorge Estrada MD [facility]        Today's Diagnoses     S/P right knee surgery    -  1       Follow-ups after your visit        Additional Services     PHYSICAL THERAPY REFERRAL (External-Prints)       Physical Therapy Referral                  Your next 10 appointments already scheduled     May 09, 2018  9:00 AM CDT   (Arrive by 8:45 AM)   Return Visit with Jorge Estrada MD   [facility] ([facility])    [address]  [address] [facility] [address] [phone]              Future tests that were ordered for you today     Open Future Orders        Priority Expected Expires Ordered    XR Knee RT 1 /2 vw Routine 3/26/2018 4/25/2018 3/26/2018            Who to contact     Please call your clinic at [phone] to:    Ask questions about your health    Make or cancel appointments    Discuss your medicines    Learn about your test results    Speak to your doctor            Additional Information About Your Visit        MyChart Information     [employer] gives you secure access to your electronic health record. If you see a primary care provider, you can also send messages to your care team and make appointments. If you have questions, please call your primary care clinic.  If you do not have a primary care provider, please call [phone] and they will assist you.      [employer] is an electronic gateway that provides easy, online access to your medical records. With [employer], you can request a clinic appointment, read your test results, renew a prescription or communicate with your care team.     To access your existing account, please contact your [facility] or call  [phone] for assistance.        Care EveryWhere ID     This is your Care EveryWhere ID. This could be used by other organizations to access your [facility] medical records  [id number]        Your Vitals Were     Last Period                   02/19/2016 (Exact Date)            Blood Pressure from Last 3 Encounters:   02/15/18 117/71   10/17/17 121/77   12/27/16 108/74    Weight from Last 3 Encounters:   02/13/18 90.1 kg (198 lb 10.2 oz)   10/17/17 88.5 kg (195 lb 3.2 oz)   10/16/17 88.6 kg (195 lb 5.2 oz)              We Performed the Following     PHYSICAL THERAPY REFERRAL (External-Prints)        Primary Care Provider Office Phone # Fax #    Gustavo Harrison PA-C [phone]       [facility] [address]  [place] [address]        Equal Access to Services     [facility] : Hadii aad ku hadasho Soomaali, waaxda luqadaha, qaybta kaalmada adeegyada, waxay idiin hayaan adeeg shannan bullock . So [facility] [phone].    ATENCIÓN: Si habla español, tiene a hannah disposición servicios gratuitos de asistencia lingüística. Vy al [phone].    We comply with applicable federal civil rights laws and [place] laws. We do not discriminate on the basis of race, color, national origin, age, disability, sex, sexual orientation, or gender identity.            Thank you!     Thank you for choosing [facility]  for your care. Our goal is always to provide you with excellent care. Hearing back from our patients is one way we can continue to improve our services. Please take a few minutes to complete the written survey that you may receive in the mail after your visit with us. Thank you!             Your Updated Medication List - Protect others around you: Learn how to safely use, store and throw away your medicines at www.disposemymeds.org.          This list is accurate as of 3/26/18 11:03 AM.  Always use your most recent med list.                   Brand Name Dispense Instructions for  use Diagnosis    acetaminophen 325 MG tablet    TYLENOL    100 tablet    Take 2 tablets (650 mg) by mouth every 4 hours as needed for other (mild pain)    Closed fracture of right tibial plateau with routine healing, subsequent encounter       albuterol 108 (90 BASE) MCG/ACT Inhaler    PROAIR HFA/PROVENTIL HFA/VENTOLIN HFA     Inhale 2 puffs into the lungs every 6 hours        ALPRAZOLAM PO      Take 0.25 mg by mouth as needed for anxiety        blood glucose monitoring lancets     1 Box    Use to test blood sugar 4 times daily or as directed.    Hyperglycemia       blood glucose monitoring meter device kit    no brand specified    1 kit    Use to test blood sugar 4 times daily or as directed.    Hyperglycemia       BLOOD GLUCOSE TEST STRIPS Strp     100 each    4 times a day check    Hyperglycemia       CITALOPRAM HYDROBROMIDE PO      Take 10 mg by mouth daily        fluticasone 220 MCG/ACT Inhaler    FLOVENT HFA     Inhale 2 puffs into the lungs 2 times daily        hydrOXYzine 25 MG tablet    ATARAX    30 tablet    Take 1 tablet (25 mg) by mouth every 6 hours as needed for itching (and nausea)    Closed fracture of right tibial plateau with routine healing, subsequent encounter       senna-docusate 8.6-50 MG per tablet    SENOKOT-S;PERICOLACE    60 tablet    Take 1-2 tablets by mouth 2 times daily    Post-traumatic osteoarthritis of right knee       traMADol 50 MG tablet    ULTRAM    60 tablet    Take 1-2 tablets ( mg) by mouth every 6 hours as needed for pain maximum 8 tablet(s) per day

## 2018-04-05 ENCOUNTER — MYC MEDICAL ADVICE (OUTPATIENT)
Dept: ORTHOPEDICS | Facility: CLINIC | Age: 28
End: 2018-04-05

## 2018-04-06 NOTE — TELEPHONE ENCOUNTER
Spoke to patient and re-wrote letter to say no restrictions starting 4/9/18. She reports she has been lifting in PT.

## 2018-05-03 DIAGNOSIS — Z98.890 S/P KNEE SURGERY: Primary | ICD-10-CM

## 2018-05-03 RX ORDER — GABAPENTIN 100 MG/1
CAPSULE ORAL
Qty: 270 CAPSULE | Refills: 0
Start: 2018-05-03

## 2018-05-03 NOTE — TELEPHONE ENCOUNTER
Prescription for Gabapentin okayed by Dr. Estrada for 100 mg p.o. TID and advance as instructed to 300 mg p.o. TID. Called in to patient's pharmacy, [facility] in [place], [place].

## 2018-05-03 NOTE — TELEPHONE ENCOUNTER
[facility] Call Center    Phone Message    May a detailed message be left on voicemail: yes    Reason for Call: Other: Pt preferred pharmacy is [facility] in [place].     Action Taken: Message routed to:  [facility] ([facility] Orthopedics

## 2018-05-09 ENCOUNTER — OFFICE VISIT (OUTPATIENT)
Dept: ORTHOPEDICS | Facility: CLINIC | Age: 28
End: 2018-05-09
Payer: COMMERCIAL

## 2018-05-09 DIAGNOSIS — Z98.890 S/P RIGHT KNEE SURGERY: Primary | ICD-10-CM

## 2018-05-09 NOTE — LETTER
5/9/2018    RE: Janice Wade  [address] [facility] [address]     DIAGNOSIS:   1. 3 months following distal femoral osteotomy  2. Status post devastating lateral tibial plateau fracture treated with open reduction internal fixation and progressive lateral compartment disease    PROCEDURES:  1. Distal femoral osteotomy February 13, 2018 (3 months out)    HISTORY:  Doing very well.  Pain is controlled.  No narcotics.  Much improved from preoperative state.  Overall happy with the progress that she has made.  Some paresthesias surrounding her incision site.  We have tried Neurontin she is currently taking 100 mg p.o. 3 times daily she has noted no benefit.    EXAM:     General: Awake, Alert, and oriented. Articulates and communicates with a normal affect     Right lower Extremity:    Incisions well healed without evidence of infection    Full motion    Knee is overall stable without any profound instability    Neurovascularly intact    IMAGING:  Images obtained at outside hospital last week and scanned into our system show complete union of her distal femoral osteotomy    ASSESSMENT:  1. 3 months status post distal femoral osteotomy with subsequent union  2. Pricilla-incisional dysesthesias  3. Devastating arthritis of the lateral compartment of her right knee following tibial plateau fracture 3 years ago    PLAN:   Weightbearing as tolerated range of motion as tolerated, okay to return to work.    If she fails to see lasting improvement ultimately consideration of an osteochondral allograft transplantation as well as an allograft to her proximal tibia could be considered.    Follow-up with me as needed if needed for future intervention otherwise activity as tolerated in the interim      Jorge Estrada MD

## 2018-05-09 NOTE — NURSING NOTE
Reason For Visit:   Chief Complaint   Patient presents with     Surgical Followup     DOS 2/13/18 Examination Under Anesthesia Right Knee, Right Knee Arthroscopy, Distal Femoral Osteotomy      Has been experiencing burning in the right knee. Gabapentin has not been helping.     Date of surgery: 2/13/18  Type of surgery:   PROCEDURES:   1.  Examination under anesthesia, right knee.   2.  Right knee arthroscopy.   3.  Chondroplasty, lateral femoral condyle and lateral tibial plateau.   4.  Open hardware removal, deep, right lateral femur ACL button.   5.  Distal femoral osteotomy, right leg with a planned 7 degree correction.   .  Smoker: Yes  Request smoking cessation information: No    Pain Assessment  Patient Currently in Pain: Yes  Primary Pain Location: Knee  Pain Orientation: Right  Pain Descriptors: Burning

## 2018-05-09 NOTE — LETTER
Return to Work  May 9, 2018     Seen today: yes    Patient:  Janice Wade  :   1990  MRN:     [id number]  Physician: MELVA ESTRADA    Janice Wade may return to work without restrictions.        Electronically signed by Melva Estrada MD & Yin Gil ATC

## 2018-05-09 NOTE — PROGRESS NOTES
DIAGNOSIS:   1. 3 months following distal femoral osteotomy  2. Status post devastating lateral tibial plateau fracture treated with open reduction internal fixation and progressive lateral compartment disease    PROCEDURES:  1. Distal femoral osteotomy February 13, 2018 (3 months out)    HISTORY:  Doing very well.  Pain is controlled.  No narcotics.  Much improved from preoperative state.  Overall happy with the progress that she has made.  Some paresthesias surrounding her incision site.  We have tried Neurontin she is currently taking 100 mg p.o. 3 times daily she has noted no benefit.    EXAM:     General: Awake, Alert, and oriented. Articulates and communicates with a normal affect     Right lower Extremity:    Incisions well healed without evidence of infection    Full motion    Knee is overall stable without any profound instability    Neurovascularly intact    IMAGING:  Images obtained at outside hospital last week and scanned into our system show complete union of her distal femoral osteotomy    ASSESSMENT:  1. 3 months status post distal femoral osteotomy with subsequent union  2. Pricilla-incisional dysesthesias  3. Devastating arthritis of the lateral compartment of her right knee following tibial plateau fracture 3 years ago    PLAN:   Weightbearing as tolerated range of motion as tolerated, okay to return to work.    If she fails to see lasting improvement ultimately consideration of an osteochondral allograft transplantation as well as an allograft to her proximal tibia could be considered.    Follow-up with me as needed if needed for future intervention otherwise activity as tolerated in the interim

## 2018-05-09 NOTE — MR AVS SNAPSHOT
After Visit Summary   5/9/2018    Janice Wade    MRN: [id number]           Patient Information     Date Of Birth          1990        Visit Information        Provider Department      5/9/2018 9:00 AM Jorge Estrada MD [facility]        Today's Diagnoses     S/P right knee surgery    -  1       Follow-ups after your visit        Who to contact     Please call your clinic at [phone] to:    Ask questions about your health    Make or cancel appointments    Discuss your medicines    Learn about your test results    Speak to your doctor            Additional Information About Your Visit        MyChart Information     [employer] gives you secure access to your electronic health record. If you see a primary care provider, you can also send messages to your care team and make appointments. If you have questions, please call your primary care clinic.  If you do not have a primary care provider, please call [phone] and they will assist you.      [employer] is an electronic gateway that provides easy, online access to your medical records. With [employer], you can request a clinic appointment, read your test results, renew a prescription or communicate with your care team.     To access your existing account, please contact your [facility] or call [phone] for assistance.        Care EveryWhere ID     This is your Care EveryWhere ID. This could be used by other organizations to access your [facility] medical records  [id number]        Your Vitals Were     Last Period                   02/19/2016 (Exact Date)            Blood Pressure from Last 3 Encounters:   02/15/18 117/71   10/17/17 121/77   12/27/16 108/74    Weight from Last 3 Encounters:   02/13/18 198 lb 10.2 oz (90.1 kg)   10/17/17 195 lb 3.2 oz (88.5 kg)   10/16/17 195 lb 5.2 oz (88.6 kg)              Today, you had the following     No orders found for display       Primary Care  Provider Office Phone # Fax #    Gustavo Harrison PA-C [phone]       [facility] [address]  [place] [address]        Equal Access to Services     DORA BONILLA : Hadtalita aad ku enriqueo Soomaali, waaxda luqadaha, qaybta kaalmada adeegyada, waxmelia obregonlul del valle. So [facility] [phone].    ATENCIÓN: Si habla español, tiene a hannah disposición servicios gratuitos de asistencia lingüística. Llame al [phone].    We comply with applicable federal civil rights laws and [place] laws. We do not discriminate on the basis of race, color, national origin, age, disability, sex, sexual orientation, or gender identity.            Thank you!     Thank you for choosing [facility]  for your care. Our goal is always to provide you with excellent care. Hearing back from our patients is one way we can continue to improve our services. Please take a few minutes to complete the written survey that you may receive in the mail after your visit with us. Thank you!             Your Updated Medication List - Protect others around you: Learn how to safely use, store and throw away your medicines at www.disposemymeds.org.          This list is accurate as of 5/9/18 10:14 AM.  Always use your most recent med list.                   Brand Name Dispense Instructions for use Diagnosis    acetaminophen 325 MG tablet    TYLENOL    100 tablet    Take 2 tablets (650 mg) by mouth every 4 hours as needed for other (mild pain)    Closed fracture of right tibial plateau with routine healing, subsequent encounter       albuterol 108 (90 Base) MCG/ACT Inhaler    PROAIR HFA/PROVENTIL HFA/VENTOLIN HFA     Inhale 2 puffs into the lungs every 6 hours        ALPRAZOLAM PO      Take 0.25 mg by mouth as needed for anxiety        blood glucose monitoring lancets     1 Box    Use to test blood sugar 4 times daily or as directed.    Hyperglycemia       blood glucose monitoring meter device kit    no brand  specified    1 kit    Use to test blood sugar 4 times daily or as directed.    Hyperglycemia       BLOOD GLUCOSE TEST STRIPS Strp     100 each    4 times a day check    Hyperglycemia       CITALOPRAM HYDROBROMIDE PO      Take 10 mg by mouth daily        fluticasone 220 MCG/ACT Inhaler    FLOVENT HFA     Inhale 2 puffs into the lungs 2 times daily        gabapentin 100 MG capsule    NEURONTIN    270 capsule    Take 1 tab 3 times a day x1 week. If no relief of pain, may increase to 1 tab a.m., 1 tab afternoon and 2 at hs x1 week. If no relief, increase to 2 tab in a.m, 1 in afternoon and 2 at hs. If no relief, increase to 2 tab 3 times a day x1 week. If no relief, may continue to increase by 1 tab to max of 3 tablets (300 mg) 3 times a day    S/P knee surgery       hydrOXYzine 25 MG tablet    ATARAX    30 tablet    Take 1 tablet (25 mg) by mouth every 6 hours as needed for itching (and nausea)    Closed fracture of right tibial plateau with routine healing, subsequent encounter       senna-docusate 8.6-50 MG per tablet    SENOKOT-S;PERICOLACE    60 tablet    Take 1-2 tablets by mouth 2 times daily    Post-traumatic osteoarthritis of right knee       traMADol 50 MG tablet    ULTRAM    60 tablet    Take 1-2 tablets ( mg) by mouth every 6 hours as needed for pain maximum 8 tablet(s) per day

## 2019-01-25 ENCOUNTER — OFFICE VISIT - RIVER FALLS (OUTPATIENT)
Dept: FAMILY MEDICINE | Facility: CLINIC | Age: 29
End: 2019-01-25
Payer: COMMERCIAL

## 2019-01-25 ASSESSMENT — MIFFLIN-ST. JEOR: SCORE: 1680.69

## 2019-01-28 ENCOUNTER — AMBULATORY - RIVER FALLS (OUTPATIENT)
Dept: FAMILY MEDICINE | Facility: CLINIC | Age: 29
End: 2019-01-28
Payer: COMMERCIAL

## 2019-01-29 LAB
VZV IGG SER QL IA: 1825
VZV IGM SER QL IF: NORMAL

## 2019-02-05 ENCOUNTER — AMBULATORY - RIVER FALLS (OUTPATIENT)
Dept: FAMILY MEDICINE | Facility: CLINIC | Age: 29
End: 2019-02-05
Payer: COMMERCIAL

## 2020-03-01 ENCOUNTER — HEALTH MAINTENANCE LETTER (OUTPATIENT)
Age: 30
End: 2020-03-01

## 2020-12-14 ENCOUNTER — HEALTH MAINTENANCE LETTER (OUTPATIENT)
Age: 30
End: 2020-12-14

## 2021-04-17 ENCOUNTER — HEALTH MAINTENANCE LETTER (OUTPATIENT)
Age: 31
End: 2021-04-17

## 2021-10-02 ENCOUNTER — HEALTH MAINTENANCE LETTER (OUTPATIENT)
Age: 31
End: 2021-10-02

## 2022-02-11 VITALS
WEIGHT: 194.4 LBS | HEART RATE: 96 BPM | HEIGHT: 63 IN | DIASTOLIC BLOOD PRESSURE: 66 MMHG | TEMPERATURE: 97.9 F | SYSTOLIC BLOOD PRESSURE: 118 MMHG | BODY MASS INDEX: 34.45 KG/M2

## 2022-02-11 VITALS
SYSTOLIC BLOOD PRESSURE: 130 MMHG | TEMPERATURE: 98.3 F | HEART RATE: 88 BPM | HEIGHT: 63 IN | WEIGHT: 218.6 LBS | DIASTOLIC BLOOD PRESSURE: 72 MMHG | BODY MASS INDEX: 38.73 KG/M2

## 2022-02-15 NOTE — NURSING NOTE
CAGE Assessment Entered On:  1/25/2019 11:31 AM CST    Performed On:  1/25/2019 11:31 AM CST by Ana M Krause CMA               Assessment   Have you ever felt you should cut down on your drinking :   No   Have people annoyed you by criticizing your drinking :   No   Have you ever felt bad or guilty about your drinking :   No   Have you ever taken a drink first thing in the morning to steady your nerves or get rid of a hangover (Eye-opener) :   No   CAGE Score :   0    Ana M Krause CMA - 1/25/2019 11:31 AM CST

## 2022-02-15 NOTE — TELEPHONE ENCOUNTER
---------------------  From: Basim Davis PA-C   To: GINGER GARCIA    Sent: 1/29/2019 6:24:59 PM CST    You are immune to Varicella Zoster    Results:  Date Result Name Value   1/28/2019 10:08 AM Varicella zoster IgG 1,825.00

## 2022-02-15 NOTE — RESULTS
PPD Administration POC Entered On:  1/28/2019 10:41 AM CST    Performed On:  1/28/2019 10:40 AM CST by Ana M Krause CMA               PPD Administration   PPD Insertion Site :   Right forearm   PPD Amount Administered (mL) :   0.1 mL   Ana M Krause CMA - 1/28/2019 10:40 AM CST   Details   Collection Date :   1/28/2019 10:14 AM CST   Expiration Date :   10/29/2020 CDT   Lot#/Manufacture :   [id number]    :   [employer]   POC Test Comments :   Patient notified to have read in 48-72 hrs.   Ana M Krause CMA - 1/28/2019 10:40 AM CST

## 2022-02-15 NOTE — NURSING NOTE
PPD Reading POC Entered On:  2/7/2019 8:35 AM CST    Performed On:  2/7/2019 8:35 AM CST by Angela Kulkarni CMA               PPD Reading   PPD mm of Induration :   0 mm   PPD Interpretation :   Negative   PPD Completion Status :   Completed   PPD Result Comment :   Read at 8:34 am   Angela Kulkarni CMA - 2/7/2019 8:35 AM CST

## 2022-02-15 NOTE — NURSING NOTE
Depression Screening Entered On:  1/25/2019 11:31 AM CST    Performed On:  1/25/2019 11:31 AM CST by Ana M Krause CMA               Depression Screening   Feeling Down, Depressed, Hopeless :   Not at all   Little Interest - Pleasure in Activities :   Not at all   Initial Depression Screen Score :   0    Trouble Falling or Staying Asleep :   Not at all   Feeling Tired or Little Energy :   Not at all   Poor Appetite or Overeating :   Not at all   Feeling Bad About Yourself :   Not at all   Trouble Concentrating :   Not at all   Moving or Speaking Slowly :   Not at all   Thoughts Better Off Dead or Hurting Self :   Not at all   Detailed Depression Screen Score :   0    Total Depression Screen Score :   0    MAGDA Difficulty with Work, Home, Others :   Not difficult at all   Ana M Krause CMA - 1/25/2019 11:31 AM CST

## 2022-02-15 NOTE — PROGRESS NOTES
Patient:   GINGER GARCIA            MRN: [id number]            FIN: [id number]               Age:   28 years     Sex:  Female     :  1990   Associated Diagnoses:   Pre-employment examination   Author:   Basim Davis PA-C      Report Summary   Diagnosis  Pre-employment examination (MIU60-WG Z02.1).  Patient Instructions   Visit Information      Date of Service: 2019 08:40 am  Performing Location: [facility]  Encounter#: [id number]      Primary Care Provider (PCP):  Basim Davis PA-C    NPI# [id number]      Referring Provider:  Basim Davis PA-C# [id number]   Visit type:  Pre-employment exam   .    Accompanied by:  No one.    Source of history:  Self.    Referral source:  Self.    History limitation:  None.       Chief Complaint   2019 8:43 AM CST    Physical        Well Adult History   Form for CVTC      Review of Systems   Constitutional:  Negative.    Eye:  Negative.    Respiratory:  Negative.    Cardiovascular:  Negative.    Breast:  Negative.    Gastrointestinal:  Negative.    Genitourinary:  Negative.    Gynecologic:  Negative.    Hematology/Lymphatics:  Negative.    Endocrine:  Negative.    Immunologic:  Negative.    Musculoskeletal:  Joint pain, Decreased range of motion, right shoulder.    Integumentary:  Negative.    Neurologic:  Negative.    Psychiatric:  Negative.       Health Status   Allergies:    Allergic Reactions (All)  Moderate  Cipro (Rash)  Severity Not Documented  Sulfa drug (Hives..)  Nonallergic Reactions (All)  Severity Not Documented  Ambien (Headache)   Medications:  (Selected)   Documented Medications  Documented  Abilify 5 mg oral tablet: = 1 tab(s) ( 5 mg ), Oral, daily, 0 Refill(s), Type: Maintenance  citalopram 10 mg oral tablet: 1 tab(s) ( 10 mg ), po, daily, 0 Refill(s), Type: Maintenance  traZODone 50 mg oral tablet: = 1 tab(s) ( 50 mg ), Oral, bid, 0 Refill(s), Type: Maintenance   Problem list:    All Problems  Tobacco abuse / ICD-9-CM  305.1 / Confirmed  Obesity / SNOMED CT 8656580681 / Probable  Major depressive disorder, recurrent, with catatonic features / ICD-9-.30 / Confirmed  Fracture, tibial plateau / SNOMED CT 314586563 / Confirmed  Endometriosis / ICD-9-.9 / Confirmed  Resolved: Pregnancy / SNOMED CT 327548411  Resolved: Pregnancy / SNOMED CT 336239989  Resolved: Pregnancy / SNOMED CT 431513898  Resolved: Ovarian Cyst / ICD-9-.2  Resolved: MRSA / SNOMED CT 081950690  Resolved: Hx MRSA infection / SNOMED CT 510537529  Resolved: Depression / ICD-9-  Resolved: Asthma / ICD-9-.90      Histories   Past Medical History:    Active  Tobacco abuse (305.1)  Endometriosis (617.9)  Resolved  Pregnancy (912072036): Onset on 1/1/2013 at 22 years.  Resolved on 2/5/2014 at 23 years.  MRSA (001903406): Onset on 5/12/2012 at 21 years.  Resolved.  Hx MRSA infection (334588713): Onset on 4/19/2012 at 21 years.  Resolved.  Pregnancy (451166853): Onset on 1/24/2012 at 21 years.  Resolved on 10/26/2012 at 21 years.  Pregnancy (724140238):  Resolved on 11/1/2006 at 15 years.  Ovarian Cyst (620.2):  Resolved.  Depression (311):  Resolved.  Asthma (493.90):  Resolved.   Family History:    Hypertension.  Father (Marcos)  Aunt (M)  Great Aunt (M)  CA - Breast cancer  Aunt (M)  Hypertension  Grandmother (M)  Heart disease  Grandmother (M)  Mother (Pilar)  Father (Marcos)  Hyperlipidemia  Grandmother (M)     Procedure history:    ORIF - Open reduction and internal fixation of fracture (762032862) on 5/8/2015 at 24 Years.  Comments:  2/18/2016 1:23 PM CST - Stephanie Abrams  Right bicondylar tibia plateau and shaft fracture.  Bilateral tubal ligation (789966015) on 1/14/2015 at 24 Years.  Hysteroscopy (085715521) on 4/15/2011 at 20 Years.  Comments:  5/11/2011 12:45 PM CDT - Jose Alberto , Stephanie  D & C; Laparoscopoy with enterolysis of adhesions, vaporization of endometriosis.  History of D&C (V45.89) on 11/1/2006 at 15 Years.  Comments:  9/4/2014  10:45 AM CDT - Karly Noble  Spontaneous   Tonsillectomy (165052947).  H/O wisdom tooth extraction (525.50).   Social History:        Alcohol Assessment: Past                     Comments:                      2015 - Gregorio HOU Lita                     Denies alcohol use.      Tobacco Assessment: Current            Cigarettes, Total pack years: 10.                     Comments:                      10/18/2017 - Rosa Gutierrez                     1 pack per day.      Substance Abuse Assessment: Denies Substance Abuse            Never      Employment and Education Assessment            Employed, Work/School description: EMT.                     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      Home and Environment Assessment            Marital status: Single.  Spouse/Partner name: Garland Mendoza.      Nutrition and Health Assessment            Type of diet: Regular.      Exercise and Physical Activity Assessment: Regular exercise            Exercise type: Cross Fit.      Sexual Assessment            Sexually active: Yes.  Sexual orientation: Heterosexual.  Contraceptive Use Details: Birth control pill.      Other Assessment            First menses age 13-14.  Regular menses.  Menstrual duration 5-6 days.  No history of abnormal Pap.      Physical Examination   Vital Signs   2019 8:43 AM CST Temperature Tympanic 98.3 DegF    Peripheral Pulse Rate 88 bpm    Pulse Site Radial artery    HR Method Manual    Systolic Blood Pressure 130 mmHg    Diastolic Blood Pressure 72 mmHg    Mean Arterial Pressure 91 mmHg    BP Site Right arm    BP Method Manual      Measurements from flowsheet : Measurements   2019 8:43 AM CST Height Measured - Standard 63 in    Weight Measured - Standard 218.6 lb    BSA 2.1 m2    Body Mass Index 38.72 kg/m2  HI      General:  Alert and oriented, No acute distress.    Eye:  Pupils are equal, round  and reactive to light, Extraocular movements are intact, Normal conjunctiva.    HENT:  Normocephalic, Tympanic membranes are clear, Normal hearing, Oral mucosa is moist, No pharyngeal erythema.    Neck:  Supple, Non-tender, No lymphadenopathy, No thyromegaly.    Respiratory:  Lungs are clear to auscultation, Respirations are non-labored, Breath sounds are equal.    Cardiovascular:  Normal rate, Regular rhythm, No murmur.    Gastrointestinal:  Soft, Non-tender, Non-distended, No organomegaly.    Genitourinary:  No costovertebral angle tenderness.    Musculoskeletal:  Normal range of motion, Normal strength, No tenderness, No swelling, Mild tenderness with internal rotation of right shoulder. Strength well maintained. .    Integumentary:  Warm, Pink, No rash.    Neurologic:  Alert, Oriented, Normal sensory, Normal motor function, No focal deficits.    Psychiatric:  Cooperative, Appropriate mood & affect.       Impression and Plan   Diagnosis     Pre-employment examination (BAA46-LY Z02.1).     Patient Instructions:       Counseled: Patient, Regarding medications, Verbalized understanding.    See form in chart. cleared without restriction. Advised visit with primary for right shoulder.

## 2022-02-15 NOTE — PROGRESS NOTES
Patient:   GINGER GARCIA            MRN: [id number]            FIN: [id number]               Age:   26 years     Sex:  Female     :  1990   Associated Diagnoses:   History of tibial fracture; Traumatic arthritis of right knee   Author:   Art Donnelly MD      Preoperative Information   Indication for surgery:  Musculoskeletal disorder, Remove paloma and screws right tibia..    Accompanied by:  No one.    Source of history:  Self.           Chief Complaint   10/16/2017 2:35 PM CDT   H&P 10/17/17 R knee [facility]     Chief complaint and symptoms noted above confirmed with patient.      Review of Systems   Constitutional:  Negative.    Ear/Nose/Mouth/Throat:  Negative.    Respiratory:  Negative.    Cardiovascular:  Negative.    Gastrointestinal:  Negative.    Genitourinary:  Negative, hysterectomy.    Hematology/Lymphatics:  Negative.    Endocrine:  Negative.    Musculoskeletal:  Negative.    Integumentary:  Negative.    Neurologic:  Negative.    Psychiatric:  Negative.       Health Status   Allergies:    Allergic Reactions (Selected)  Moderate  Cipro (Rash)  Severity Not Documented  Sulfa drug (Hives..)  Nonallergic Reactions (Selected)  Severity Not Documented  Ambien (Headache)   Medications:  (Selected)   Prescriptions  Prescribed  Motrin 600 mg oral tablet: 1 tab(s) ( 600 mg ), PO, q6 hrs, Instructions: with food or milk, PRN: for pain, # 100 tab(s), 1 Refill(s), Type: Maintenance, Pharmacy: [id number], 1 tab(s) po q6 hrs,PRN:for pain,Instr:with food or milk  Documented Medications  Documented  Vitamin B Complex oral capsule: 1 cap(s), po, daily, 0 Refill(s), Type: Maintenance  citalopram 10 mg oral tablet: 1 tab(s) ( 10 mg ), po, daily, 0 Refill(s), Type: Maintenance   Problem list:    All Problems  Fracture, tibial plateau / SNOMED CT 313259513 / Confirmed  Endometriosis / ICD-9-.9 / Confirmed  Major depressive disorder, recurrent, with catatonic features / ICD-9-.30  / Confirmed  Obesity / SNOMED CT 1937679291 / Probable  Tobacco abuse / ICD-9-.1 / Confirmed  Resolved: Asthma / ICD-9-.90  Resolved: Depression / ICD-9-  Resolved: Hx MRSA infection / SNOMED CT 647795131  Resolved: MRSA / SNOMED CT 554486669  Resolved: Ovarian Cyst / ICD-9-.2  Resolved: Pregnancy / SNOMED CT 183134927  Resolved: Pregnancy / SNOMED CT 084045362  Resolved: Pregnancy / SNOMED CT 535926185      Histories   Past Medical History:    Active  Tobacco abuse (ICD-9-.1)  Endometriosis (ICD-9-.9)  Resolved  Pregnancy (SNOMED CT 951272965): Onset on 1/1/2013 at 22 years.  Resolved on 2/5/2014 at 23 years.  MRSA (SNOMED CT 921182879): Onset on 5/12/2012 at 21 years.  Resolved.  Hx MRSA infection (SNOMED CT 217607497): Onset on 4/19/2012 at 21 years.  Resolved.  Pregnancy (SNOMED CT 910475741): Onset on 1/24/2012 at 21 years.  Resolved on 10/26/2012 at 21 years.  Pregnancy (SNOMED CT 040453392):  Resolved on 11/1/2006 at 15 years.  Ovarian Cyst (ICD-9-.2):  Resolved.  Depression (ICD-9-):  Resolved.  Asthma (ICD-9-.90):  Resolved.   Family History:    Hypertension.  Father (Marcos)  Aunt (M)  Great Aunt (M)  CA - Breast cancer  Aunt (M)  Hypertension  Grandmother (M)  Heart disease  Grandmother (M)  Mother (Pilar)  Father (Marcos)  Hyperlipidemia  Grandmother (M)     Procedure history:    ORIF - Open reduction and internal fixation of fracture (SNOMED CT 710025959) on 5/8/2015 at 24 Years.  Comments:  2/18/2016 1:23 PM - Stephanie Abrams  Right bicondylar tibia plateau and shaft fracture.  Bilateral tubal ligation (SNOMED CT 409760646) performed by Marcos Woody DO on 1/14/2015 at 24 Years.  Hysteroscopy (SNOMED CT 841781419) performed by Freddy Levi MD on 4/15/2011 at 20 Years.  Comments:  5/11/2011 12:45 PM - Stephanie Abrams  D & C; Laparoscopoy with enterolysis of adhesions, vaporization of endometriosis.  History of D&C (ICD-9-CM V45.89) on 11/1/2006 at  15 Years.  Comments:  2014 10:45 AM - Karly Noble  Spontaneous   Tonsillectomy (SNOMED CT 553135566).  H/O wisdom tooth extraction (ICD-9-.50).   Social History:        Alcohol Assessment: Past                     Comments:                      2015 - Lita Perkins LPN                     Denies alcohol use.      Tobacco Assessment: Current            10 per day.  Total pack years: 10.      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      Home and Environment Assessment            Marital status: Single.  Spouse/Partner name: Garland Mendoza.      Nutrition and Health Assessment            Type of diet: Regular.      Exercise and Physical Activity Assessment: Does not exercise      Sexual Assessment            Sexually active: Yes.  Sexual orientation: Heterosexual.  Contraceptive Use Details: Birth control pill.      Other Assessment            First menses age 13-14.  Regular menses.  Menstrual duration 5-6 days.  No history of abnormal Pap.       Has no history of anemia.  Has no history of DVT or pulmonary embolism.  Has no personal history of bleeding problems.   Has no personal or family history of anesthesia reactions.  Patient does not have active tuberculosis.    S/he has not taken aspirin or aspirin containing products in the last week.     S/he has not taken Plavix (Clopidogrel) in the last 2 weeks.    S/he has not taken warfarin in the past week.    S/he has not been on corticosteroids for more than 2 weeks recently.      S/he is not DNR before, during or after surgery.    Chest pain / SOB walking up 2 flights of steps? NO  Pain in neck or jaw? NO  CAD MI?  NO  A fib? NO  Heart Failure? NO  Asthma  or Bronchitis? Intermittent Asthma uses puffer 2x a  year  Diabetes? NO       Insulin/Orals?   Seizure Disorder? NO  Chronic Kidney Disease? NO  Thyroid Disease? NO  Liver Disease NO  CVA? NO         Physical Examination   Vital Signs   10/16/2017 2:35 PM CDT Temperature Tympanic 97.9 DegF    Peripheral Pulse Rate 96 bpm    Pulse Site Radial artery    HR Method Manual    Systolic Blood Pressure 118 mmHg    Diastolic Blood Pressure 66 mmHg    Mean Arterial Pressure 83 mmHg    BP Site Left arm    BP Method Manual      General:  Alert and oriented, No acute distress.    Eye:  Normal conjunctiva.    HENT:  Normocephalic.    Neck:  Supple, Non-tender.    Respiratory:  Lungs are clear to auscultation, Respirations are non-labored.    Cardiovascular:  Normal rate, Regular rhythm.    Gastrointestinal:  Soft, Non-tender, Non-distended.    Lymphatics:  No lymphadenopathy neck, axilla, groin.    Musculoskeletal:       Lower extremity exam: Lower leg ( Right, Tenderness, Pain ).    Integumentary:  Warm, Dry, Pink.    Neurologic:  Alert, Oriented, Normal sensory.    Psychiatric:  Cooperative, Appropriate mood & affect, Normal judgment, Non-suicidal.       Review / Management         Results review:  HGB 14.2  WBC 8600  .       Impression and Plan   Diagnosis     History of tibial fracture (ORR69-AM Z87.81).     Traumatic arthritis of right knee (UAZ03-RZ M12.561).     Condition:  Stable.    Orders     Orders   Requests (Lab):  CBC w/o Diff (Request) (Order): Priority: Urgent, History of tibial fracture  Traumatic arthritis of right knee.     Patient Instructions:  May proceed with anticipated surgery, risk vs benefits discussed.  OK for general endotracheal anesthesia..

## 2022-02-15 NOTE — RESULTS
PPD Administration POC Entered On:  2/5/2019 8:17 AM CST    Performed On:  2/5/2019 8:15 AM CST by Ana M Krause CMA               PPD Administration   PPD Insertion Site :   Left forearm   POC Test Comments :   Patient notified to have read in 48-72 hrs.   PPD Amount Administered (mL) :   0.1 mL   Ana M Krause CMA - 2/5/2019 8:16 AM CST   Details   Collection Date :   2/5/2019 8:14 AM CST   Expiration Date :   10/29/2020 CDT   Lot#/Manufacture :   [id number]    :   [employer] Sadek CMA, Alicia - 2/5/2019 8:16 AM CST

## 2022-02-15 NOTE — NURSING NOTE
Comprehensive Intake Entered On:  1/25/2019 8:51 AM CST    Performed On:  1/25/2019 8:43 AM CST by Ana M Krause CMA               Summary   Chief Complaint :   Physical   Advance Directive :   No   Menstrual Status :   Menarcheal   Weight Measured :   218.6 lb(Converted to: 218 lb 10 oz, 99.16 kg)    Height Measured :   63 in(Converted to: 5 ft 3 in, 160.02 cm)    Body Mass Index :   38.72 kg/m2 (HI)    Body Surface Area :   2.1 m2   Systolic Blood Pressure :   130 mmHg   Diastolic Blood Pressure :   72 mmHg   Mean Arterial Pressure :   91 mmHg   Peripheral Pulse Rate :   88 bpm   BP Site :   Right arm   Pulse Site :   Radial artery   BP Method :   Manual   HR Method :   Manual   Temperature Tympanic :   98.3 DegF(Converted to: 36.8 DegC)    Race :      Languages :   English   Ethnicity :   Not  or    Ana M Krause CMA - 1/25/2019 8:43 AM CST   Health Status   Allergies Verified? :   Yes   Medication History Verified? :   Yes   Immunizations Current :   Yes   Medical History Verified? :   Yes   Ana M Krause CMA - 1/25/2019 8:43 AM CST   Consents   Consent for Immunization Exchange :   Consent Granted   Consent for Immunizations to Providers :   Consent Granted   Ana M Krause CMA - 1/25/2019 8:43 AM CST   Meds / Allergies   (As Of: 1/25/2019 8:51:41 AM CST)   Allergies (Active)   Ambien  Estimated Onset Date:   Unspecified ; Reactions:   Headache ; Created By:   Lita Perkins LPN; Reaction Status:   Active ; Category:   Drug ; Substance:   Ambien ; Type:   Intolerance ; Updated By:   Lita Perkins LPN; Reviewed Date:   1/25/2019 8:49 AM CST      Cipro  Estimated Onset Date:   <not entered> 8/28/2014 ; Reactions:   Rash ; Created By:   Basim Davis PA-C; Reaction Status:   Active ; Category:   Drug ; Substance:   Cipro ; Type:   Allergy ; Severity:   Moderate ; Updated By:   Basim Davis PA-C; Source:   Patient ; Reviewed Date:   1/25/2019 8:49 AM CST      sulfa drug  Estimated Onset  Date:   Unspecified ; Reactions:   Hives.. ; Created By:   Dora Hendricks; Reaction Status:   Active ; Category:   Drug ; Substance:   sulfa drug ; Type:   Allergy ; Updated By:   Dora Hendricks; Reviewed Date:   1/25/2019 8:49 AM CST        Medication List   (As Of: 1/25/2019 8:51:41 AM CST)   Home Meds    aripiprazole  :   aripiprazole ; Status:   Documented ; Ordered As Mnemonic:   Abilify 5 mg oral tablet ; Simple Display Line:   5 mg, 1 tab(s), Oral, daily, 0 Refill(s) ; Catalog Code:   aripiprazole ; Order Dt/Tm:   1/25/2019 8:49:18 AM          citalopram  :   citalopram ; Status:   Documented ; Ordered As Mnemonic:   citalopram 10 mg oral tablet ; Simple Display Line:   10 mg, 1 tab(s), po, daily, 0 Refill(s) ; Catalog Code:   citalopram ; Order Dt/Tm:   10/16/2017 2:38:36 PM          multivitamin  :   multivitamin ; Status:   Processing ; Ordered As Mnemonic:   Vitamin B Complex oral capsule ; Action Display:   Complete ; Catalog Code:   multivitamin ; Order Dt/Tm:   1/25/2019 8:49:57 AM          traZODone  :   traZODone ; Status:   Documented ; Ordered As Mnemonic:   traZODone 50 mg oral tablet ; Simple Display Line:   50 mg, 1 tab(s), Oral, bid, 0 Refill(s) ; Catalog Code:   traZODone ; Order Dt/Tm:   1/25/2019 8:49:29 AM

## 2022-05-14 ENCOUNTER — HEALTH MAINTENANCE LETTER (OUTPATIENT)
Age: 32
End: 2022-05-14

## 2022-09-03 ENCOUNTER — HEALTH MAINTENANCE LETTER (OUTPATIENT)
Age: 32
End: 2022-09-03

## 2022-12-14 NOTE — PROGRESS NOTES
02/14/18 0836   Quick Adds   Type of Visit Initial PT Evaluation   Living Environment   Lives With child(santa), dependent   Living Arrangements house   Home Accessibility no concerns   Number of Stairs to Enter Home 2   Number of Stairs Within Home 0   Stair Railings at Home outside, present on right side   Transportation Available family or friend will provide   Living Environment Comment Pt will stay at grandmother's house for the first couple weeks,  no stairs,  walk-in shower   Self-Care   Dominant Hand right   Usual Activity Tolerance moderate   Current Activity Tolerance moderate   Equipment Currently Used at Home none   Activity/Exercise/Self-Care Comment My legs stop me a lot.    Functional Level Prior   Ambulation 0-->independent   Transferring 0-->independent   Toileting 0-->independent   Bathing 0-->independent   Dressing 0-->independent   Eating 0-->independent   Communication 0-->understands/communicates without difficulty   Swallowing 0-->swallows foods/liquids without difficulty   Cognition 0 - no cognition issues reported   Fall history within last six months no   Which of the above functional risks had a recent onset or change? none   Prior Functional Level Comment Independent with community ambulation but limtied by pain.    General Information   Onset of Illness/Injury or Date of Surgery - Date 02/13/18   Referring Physician Dr. Estrada/Raymon Garay   Patient/Family Goals Statement Pt wants to resume normal activity and not mess this (surgery) up.    Pertinent History of Current Problem (include personal factors and/or comorbidities that impact the POC) POD #1 right knee distal femoral osteotomy, chondroplasty of lateral femoral condyle and lateral tibial plateau, open hardware removal, ACL button, PMH: anxiety, depressive d/o, endometriosis, hysterectomy, LBP, asthma, ACL reconstruction 2/26/16, ORIF 11/01/16   Precautions/Limitations (HKB on @ all times and locked in ext. for OOB activity)  Refill request from Pharmacy for the medication listed below.  Patient was last seen 6/17/22 for a Pre-op., with a follow up due - not noted.     Next appt 5/12/23 with Dr. Baron to establish care.      Last written as     Disp Refills Start End    meloxicam (MOBIC) 15 MG tablet 90 tablet 1 3/14/2022     Sig - Route: Take 1 tablet by mouth daily as needed for Pain. Take with food, stop if causes stomach upset - Oral        Protocol FAILED       Refill forwarded to provider for approval  NSAID Refill Protocol - 12 Month Protocol Failed 12/13/2022 04:51 PM   Protocol Details  AST less than 55 in past 9 months looking at last value    ALT less than 90 in past 9 months looking at last value      ALT/AST last checked 10/16/2019  Component      Latest Ref Rng & Units 10/16/2019   AST/SGOT      <38 Units/L 18   ALT/SGPT      <64 Units/L 23       Pharmacy: [facility]      Weight-Bearing Status - RLE toe touch weight-bearing   General Observations No IV's, no lives or leads just hep-loc   General Info Comments Nerve block is still working, just quit smoking and worried that this will make her crave pain meds   Cognitive Status Examination   Orientation orientation to person, place and time   Level of Consciousness alert   Follows Commands and Answers Questions 100% of the time   Personal Safety and Judgment intact   Memory intact   Pain Assessment   Patient Currently in Pain No  (last night 8/10 pain, )   Integumentary/Edema   Integumentary/Edema Comments Leg is wrapped with an ace,  tenderness at incision line per pt, no significant swelling   Range of Motion (ROM)   ROM Comment Pt able to achieve 55 degrees flexion in sitting.    Strength   Strength Comments Functionally tested. Able to perform SLR independently,  demo's strong quad setting and DF ,limited by TTWB status, no resistance testing at knee   Bed Mobility   Bed Mobility Comments independent   Transfer Skills   Transfer Comments mod I   Gait   Gait Comments superision with walker gait.  Fatigued. Prefers to NWB due to pain but understands TTWB x 6 weeks.    Balance   Balance Comments needs bilateral UE support  due to compromised WB status   Sensory Examination   Sensory Perception Comments CMS x 4 per pt   Coordination   Coordination no deficits were identified   Muscle Tone   Muscle Tone no deficits were identified   Modality Interventions   Planned Modality Interventions Cryotherapy   General Therapy Interventions   Planned Therapy Interventions gait training;ROM;strengthening;home program guidelines   Clinical Impression   Criteria for Skilled Therapeutic Intervention yes, treatment indicated   PT Diagnosis impaired functional mobility    Influenced by the following impairments decr. ROM, decr. strength   Functional limitations due to impairments decr. independence with gait   Clinical Presentation  "Stable/Uncomplicated   Clinical Presentation Rationale good pain control, tolerates activity well, maintains precatuions.    Clinical Decision Making (Complexity) Low complexity   Therapy Frequency` 2 times/day   Predicted Duration of Therapy Intervention (days/wks) 2   Anticipated Equipment Needs at Discharge (has crutches coming in from home)   Anticipated Discharge Disposition Home with Outpatient Therapy   Risk & Benefits of therapy have been explained Yes   Patient, Family & other staff in agreement with plan of care Yes   [facility] [place] \"6 Clicks\"   2016, Trustees of [facility], under license to [employer].  All rights reserved.   6 Clicks Short Forms Basic Mobility Inpatient Short Form   [facility] AM-PAC  \"6 Clicks\" V.2 Basic Mobility Inpatient Short Form   1. Turning from your back to your side while in a flat bed without using bedrails? 4 - None   2. Moving from lying on your back to sitting on the side of a flat bed without using bedrails? 4 - None   3. Moving to and from a bed to a chair (including a wheelchair)? 4 - None   4. Standing up from a chair using your arms (e.g., wheelchair, or bedside chair)? 4 - None   5. To walk in hospital room? 4 - None   6. Climbing 3-5 steps with a railing? 3 - A Little   Basic Mobility Raw Score (Score out of 24.Lower scores equate to lower levels of function) 23   Total Evaluation Time   Total Evaluation Time (Minutes) 9     "

## 2023-06-02 ENCOUNTER — HEALTH MAINTENANCE LETTER (OUTPATIENT)
Age: 33
End: 2023-06-02

## (undated) DEVICE — LINEN GOWN XLG 5407

## (undated) DEVICE — SOL WATER IRRIG 1000ML BOTTLE 2F7114

## (undated) DEVICE — SOL NACL 0.9% IRRIG 1000ML BOTTLE 2F7124

## (undated) DEVICE — SU MONOCRYL 3-0 PS-1 27" Y936H

## (undated) DEVICE — PREP DURAPREP 26ML APL 8630

## (undated) DEVICE — DRSG ABDOMINAL PAD UNSTERILE 8X10" WND152764B

## (undated) DEVICE — DECANTER VIAL 2006S

## (undated) DEVICE — Device

## (undated) DEVICE — STRAP KNEE/BODY 31143004

## (undated) DEVICE — GOWN IMPERVIOUS SPECIALTY XLG/XLONG 32474

## (undated) DEVICE — PREP CHLORAPREP 26ML TINTED ORANGE  260815

## (undated) DEVICE — LINEN DRAPE 54X72" 5467

## (undated) DEVICE — CAST PADDING 4" STERILE 9044S

## (undated) DEVICE — ESU PENCIL W/SMOKE EVAC NEPTUNE STRYKER 0703-046-000

## (undated) DEVICE — SU VICRYL 3-0 SH 27" UND J416H

## (undated) DEVICE — DRSG STERI STRIP 1/2X4" R1547

## (undated) DEVICE — PACK ARTHROSCOPY CUSTOM ASC

## (undated) DEVICE — ESU GROUND PAD ADULT W/CORD E7507

## (undated) DEVICE — LINEN TOWEL PACK X5 5464

## (undated) DEVICE — SU VICRYL 2-0 CT-2 27" UND J269H

## (undated) DEVICE — DRAPE STOCKINETTE IMPERVIOUS 12" 1587

## (undated) DEVICE — SUCTION MANIFOLD DORNOCH ULTRA CART UL-CL500

## (undated) DEVICE — DRAPE C-ARMOR 5 SIDED 5523

## (undated) DEVICE — BUR ARTHREX COOLCUT SABRE 3.8MMX13CM AR-8380SR

## (undated) DEVICE — SOL NACL 0.9% IRRIG 3000ML BAG 2B7477

## (undated) DEVICE — CAST PADDING 6" STERILE 9046S

## (undated) DEVICE — GLOVE PROTEXIS POWDER FREE 8.0 ORTHOPEDIC 2D73ET80

## (undated) DEVICE — SU VICRYL 0 CT-1 3X27" J430T

## (undated) DEVICE — PAD ARMBOARD FOAM EGGCRATE COVIDEN 3114367

## (undated) DEVICE — PACK LOWER EXTREMITY CUSTOM ASC

## (undated) DEVICE — BLADE SHAVER ARTHRO 3.5MM FULL RADIUS C9248

## (undated) DEVICE — LINEN ORTHO PACK 5446

## (undated) DEVICE — COVER CAMERA IN-LIGHT DISP LT-C02

## (undated) DEVICE — BLADE CLIPPER SGL USE 9680

## (undated) DEVICE — SU ETHILON 3-0 PS-1 18" 1663H

## (undated) DEVICE — SU VICRYL 1 CT-1 36" J347H

## (undated) DEVICE — GLOVE PROTEXIS POWDER FREE SMT 8.0  2D72PT80X

## (undated) DEVICE — NDL 22GA 1.5"

## (undated) DEVICE — LIGHT HANDLE X1 31140133

## (undated) DEVICE — SUCTION MANIFOLD NEPTUNE 2 SYS 4 PORT 0702-020-000

## (undated) DEVICE — ESU PENCIL SMOKE EVAC W/ROCKER SWITCH 0703-047-000

## (undated) DEVICE — PIN GUIDE 2.4MM ARTHREX AR-13303-2.4

## (undated) DEVICE — DRAPE C-ARM W/STRAPS 42X72" 07-CA104

## (undated) DEVICE — DRSG GAUZE 4X8"

## (undated) DEVICE — SPONGE RAY-TEC 4X8" 7318

## (undated) DEVICE — SU VICRYL 0 CT-1 27" J340H

## (undated) DEVICE — DRAPE CONVERTORS U-DRAPE 60X72" 8476

## (undated) DEVICE — GLOVE PROTEXIS BLUE W/NEU-THERA 8.0  2D73EB80

## (undated) DEVICE — CAST PADDING 6" UNSTERILE 9046

## (undated) DEVICE — DRSG ADAPTIC 3X8" 6113

## (undated) DEVICE — BLADE SAW SAGITTAL STRK 19.5X90X1.27MM 2108-109-000S11

## (undated) DEVICE — TUBING ARTHRO CONMED/LINVATEC PUMP BLUE INFLOW 10K100

## (undated) DEVICE — SU VICRYL 3-0 CT-1 36" J344H

## (undated) DEVICE — PACK ACL SUPPLEMENT STD

## (undated) RX ORDER — FENTANYL CITRATE 50 UG/ML
INJECTION, SOLUTION INTRAMUSCULAR; INTRAVENOUS
Status: DISPENSED
Start: 2017-10-17

## (undated) RX ORDER — FENTANYL CITRATE 50 UG/ML
INJECTION, SOLUTION INTRAMUSCULAR; INTRAVENOUS
Status: DISPENSED
Start: 2018-02-13

## (undated) RX ORDER — LIDOCAINE HYDROCHLORIDE 10 MG/ML
INJECTION, SOLUTION INFILTRATION; PERINEURAL
Status: DISPENSED
Start: 2017-02-01

## (undated) RX ORDER — GLYCOPYRROLATE 0.2 MG/ML
INJECTION, SOLUTION INTRAMUSCULAR; INTRAVENOUS
Status: DISPENSED
Start: 2018-02-13

## (undated) RX ORDER — PROPOFOL 10 MG/ML
INJECTION, EMULSION INTRAVENOUS
Status: DISPENSED
Start: 2018-02-13

## (undated) RX ORDER — ONDANSETRON 2 MG/ML
INJECTION INTRAMUSCULAR; INTRAVENOUS
Status: DISPENSED
Start: 2017-10-17

## (undated) RX ORDER — GABAPENTIN 300 MG/1
CAPSULE ORAL
Status: DISPENSED
Start: 2017-10-17

## (undated) RX ORDER — DEXAMETHASONE SODIUM PHOSPHATE 4 MG/ML
INJECTION, SOLUTION INTRA-ARTICULAR; INTRALESIONAL; INTRAMUSCULAR; INTRAVENOUS; SOFT TISSUE
Status: DISPENSED
Start: 2017-10-17

## (undated) RX ORDER — DEXAMETHASONE SODIUM PHOSPHATE 4 MG/ML
INJECTION, SOLUTION INTRA-ARTICULAR; INTRALESIONAL; INTRAMUSCULAR; INTRAVENOUS; SOFT TISSUE
Status: DISPENSED
Start: 2018-02-13

## (undated) RX ORDER — OXYCODONE HYDROCHLORIDE 5 MG/1
TABLET ORAL
Status: DISPENSED
Start: 2017-10-17

## (undated) RX ORDER — PROPOFOL 10 MG/ML
INJECTION, EMULSION INTRAVENOUS
Status: DISPENSED
Start: 2017-10-17

## (undated) RX ORDER — TRIAMCINOLONE ACETONIDE 40 MG/ML
INJECTION, SUSPENSION INTRA-ARTICULAR; INTRAMUSCULAR
Status: DISPENSED
Start: 2017-02-01

## (undated) RX ORDER — LIDOCAINE HYDROCHLORIDE 20 MG/ML
INJECTION, SOLUTION EPIDURAL; INFILTRATION; INTRACAUDAL; PERINEURAL
Status: DISPENSED
Start: 2018-02-13

## (undated) RX ORDER — KETOROLAC TROMETHAMINE 30 MG/ML
INJECTION, SOLUTION INTRAMUSCULAR; INTRAVENOUS
Status: DISPENSED
Start: 2017-10-17

## (undated) RX ORDER — CEFAZOLIN SODIUM 2 G/100ML
INJECTION, SOLUTION INTRAVENOUS
Status: DISPENSED
Start: 2018-02-13

## (undated) RX ORDER — ACETAMINOPHEN 325 MG/1
TABLET ORAL
Status: DISPENSED
Start: 2017-10-17

## (undated) RX ORDER — BUPIVACAINE HYDROCHLORIDE 2.5 MG/ML
INJECTION, SOLUTION EPIDURAL; INFILTRATION; INTRACAUDAL
Status: DISPENSED
Start: 2017-10-17

## (undated) RX ORDER — HYDROXYZINE HYDROCHLORIDE 25 MG/1
TABLET, FILM COATED ORAL
Status: DISPENSED
Start: 2018-02-13

## (undated) RX ORDER — LIDOCAINE HYDROCHLORIDE 20 MG/ML
INJECTION, SOLUTION EPIDURAL; INFILTRATION; INTRACAUDAL; PERINEURAL
Status: DISPENSED
Start: 2017-10-17

## (undated) RX ORDER — ONDANSETRON 2 MG/ML
INJECTION INTRAMUSCULAR; INTRAVENOUS
Status: DISPENSED
Start: 2018-02-13